# Patient Record
Sex: FEMALE | Race: WHITE | NOT HISPANIC OR LATINO | Employment: FULL TIME | URBAN - METROPOLITAN AREA
[De-identification: names, ages, dates, MRNs, and addresses within clinical notes are randomized per-mention and may not be internally consistent; named-entity substitution may affect disease eponyms.]

---
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
## 2022-01-04 ENCOUNTER — TELEPHONE (OUTPATIENT)
Dept: OBSTETRICS AND GYNECOLOGY | Facility: CLINIC | Age: 22
End: 2022-01-04

## 2022-01-04 NOTE — TELEPHONE ENCOUNTER
Oliver Vega OB pt requesting nicotine patches because she vapes? I am not sure if this should go directly to Dr. Boyd, but thought I would check with you first.    Pharmacy: [facility]/pharmacy #[id number] - [address] - [phone] [facility] [phone] FX  Thank you,  Breonna

## 2022-01-04 NOTE — TELEPHONE ENCOUNTER
Phone call. 17w3d. OB 01/26/2022. Requesting nicotine patches because she vapes. See previous message. Thank you.

## 2022-01-21 ENCOUNTER — TELEPHONE (OUTPATIENT)
Dept: OBSTETRICS AND GYNECOLOGY | Facility: CLINIC | Age: 22
End: 2022-01-21

## 2022-01-21 RX ORDER — NITROFURANTOIN 25; 75 MG/1; MG/1
100 CAPSULE ORAL 2 TIMES DAILY
Qty: 14 CAPSULE | Refills: 0 | Status: SHIPPED | OUTPATIENT
Start: 2022-01-21 | End: 2022-01-28

## 2022-01-27 ENCOUNTER — ROUTINE PRENATAL (OUTPATIENT)
Dept: OBSTETRICS AND GYNECOLOGY | Facility: CLINIC | Age: 22
End: 2022-01-27

## 2022-01-27 VITALS — SYSTOLIC BLOOD PRESSURE: 111 MMHG | BODY MASS INDEX: 24.62 KG/M2 | DIASTOLIC BLOOD PRESSURE: 59 MMHG | WEIGHT: 139 LBS

## 2022-01-27 DIAGNOSIS — Z34.00 SUPERVISION OF NORMAL FIRST PREGNANCY, ANTEPARTUM: ICD-10-CM

## 2022-01-27 DIAGNOSIS — O99.019 MATERNAL ANEMIA IN PREGNANCY, ANTEPARTUM: ICD-10-CM

## 2022-01-27 DIAGNOSIS — Z3A.20 20 WEEKS GESTATION OF PREGNANCY: Primary | ICD-10-CM

## 2022-01-27 LAB
GLUCOSE UR STRIP-MCNC: NEGATIVE MG/DL
PROT UR STRIP-MCNC: ABNORMAL MG/DL

## 2022-01-27 PROCEDURE — 99212 OFFICE O/P EST SF 10 MIN: CPT | Performed by: STUDENT IN AN ORGANIZED HEALTH CARE EDUCATION/TRAINING PROGRAM

## 2022-02-24 ENCOUNTER — ROUTINE PRENATAL (OUTPATIENT)
Dept: OBSTETRICS AND GYNECOLOGY | Facility: CLINIC | Age: 22
End: 2022-02-24

## 2022-02-24 VITALS — DIASTOLIC BLOOD PRESSURE: 73 MMHG | WEIGHT: 147 LBS | SYSTOLIC BLOOD PRESSURE: 119 MMHG | BODY MASS INDEX: 26.04 KG/M2

## 2022-02-24 DIAGNOSIS — Z87.440 HISTORY OF GBS (GROUP B STREPTOCOCCUS) UTI, CURRENTLY PREGNANT: ICD-10-CM

## 2022-02-24 DIAGNOSIS — Z3A.24 24 WEEKS GESTATION OF PREGNANCY: Primary | ICD-10-CM

## 2022-02-24 DIAGNOSIS — O99.330 TOBACCO USE DURING PREGNANCY, ANTEPARTUM: ICD-10-CM

## 2022-02-24 DIAGNOSIS — Z34.02 ENCOUNTER FOR SUPERVISION OF NORMAL FIRST PREGNANCY IN SECOND TRIMESTER: ICD-10-CM

## 2022-02-24 DIAGNOSIS — Z34.90 PRENATAL CARE, ANTEPARTUM: ICD-10-CM

## 2022-02-24 DIAGNOSIS — O99.019 MATERNAL ANEMIA IN PREGNANCY, ANTEPARTUM: ICD-10-CM

## 2022-02-24 DIAGNOSIS — O09.899 HISTORY OF GBS (GROUP B STREPTOCOCCUS) UTI, CURRENTLY PREGNANT: ICD-10-CM

## 2022-02-24 DIAGNOSIS — Z30.09 ENCOUNTER FOR COUNSELING REGARDING CONTRACEPTION: ICD-10-CM

## 2022-02-24 LAB
GLUCOSE UR STRIP-MCNC: NEGATIVE MG/DL
PROT UR STRIP-MCNC: ABNORMAL MG/DL

## 2022-02-24 PROCEDURE — 99214 OFFICE O/P EST MOD 30 MIN: CPT | Performed by: STUDENT IN AN ORGANIZED HEALTH CARE EDUCATION/TRAINING PROGRAM

## 2022-02-26 LAB
BACTERIA UR CULT: NORMAL
BACTERIA UR CULT: NORMAL

## 2022-03-24 ENCOUNTER — ROUTINE PRENATAL (OUTPATIENT)
Dept: OBSTETRICS AND GYNECOLOGY | Facility: CLINIC | Age: 22
End: 2022-03-24

## 2022-03-24 VITALS — BODY MASS INDEX: 26.57 KG/M2 | WEIGHT: 150 LBS | DIASTOLIC BLOOD PRESSURE: 70 MMHG | SYSTOLIC BLOOD PRESSURE: 112 MMHG

## 2022-03-24 DIAGNOSIS — Z34.93 PRENATAL CARE IN THIRD TRIMESTER: ICD-10-CM

## 2022-03-24 DIAGNOSIS — O99.330 TOBACCO USE DURING PREGNANCY, ANTEPARTUM: ICD-10-CM

## 2022-03-24 DIAGNOSIS — Z3A.28 28 WEEKS GESTATION OF PREGNANCY: Primary | ICD-10-CM

## 2022-03-24 DIAGNOSIS — Z34.00 SUPERVISION OF NORMAL FIRST PREGNANCY, ANTEPARTUM: ICD-10-CM

## 2022-03-24 DIAGNOSIS — O99.019 MATERNAL ANEMIA IN PREGNANCY, ANTEPARTUM: ICD-10-CM

## 2022-03-24 LAB
GLUCOSE UR STRIP-MCNC: NEGATIVE MG/DL
PROT UR STRIP-MCNC: ABNORMAL MG/DL

## 2022-03-24 PROCEDURE — 99212 OFFICE O/P EST SF 10 MIN: CPT | Performed by: STUDENT IN AN ORGANIZED HEALTH CARE EDUCATION/TRAINING PROGRAM

## 2022-03-25 DIAGNOSIS — O99.019 MATERNAL ANEMIA IN PREGNANCY, ANTEPARTUM: Primary | ICD-10-CM

## 2022-03-25 RX ORDER — FERROUS SULFATE 325(65) MG
325 TABLET ORAL
Qty: 30 TABLET | Refills: 5 | Status: SHIPPED | OUTPATIENT
Start: 2022-03-25 | End: 2022-06-08 | Stop reason: HOSPADM

## 2022-03-26 ENCOUNTER — REFERRAL TRIAGE (OUTPATIENT)
Dept: LABOR AND DELIVERY | Facility: HOSPITAL | Age: 22
End: 2022-03-26

## 2022-04-07 ENCOUNTER — ROUTINE PRENATAL (OUTPATIENT)
Dept: OBSTETRICS AND GYNECOLOGY | Facility: CLINIC | Age: 22
End: 2022-04-07

## 2022-04-07 VITALS — BODY MASS INDEX: 27.1 KG/M2 | WEIGHT: 153 LBS | DIASTOLIC BLOOD PRESSURE: 63 MMHG | SYSTOLIC BLOOD PRESSURE: 113 MMHG

## 2022-04-07 DIAGNOSIS — Z87.440 HISTORY OF GBS (GROUP B STREPTOCOCCUS) UTI, CURRENTLY PREGNANT: ICD-10-CM

## 2022-04-07 DIAGNOSIS — Z34.00 SUPERVISION OF NORMAL FIRST PREGNANCY, ANTEPARTUM: ICD-10-CM

## 2022-04-07 DIAGNOSIS — O09.899 HISTORY OF GBS (GROUP B STREPTOCOCCUS) UTI, CURRENTLY PREGNANT: ICD-10-CM

## 2022-04-07 DIAGNOSIS — Z3A.30 30 WEEKS GESTATION OF PREGNANCY: Primary | ICD-10-CM

## 2022-04-07 DIAGNOSIS — B37.9 YEAST INFECTION: ICD-10-CM

## 2022-04-07 DIAGNOSIS — O99.019 MATERNAL ANEMIA IN PREGNANCY, ANTEPARTUM: ICD-10-CM

## 2022-04-07 DIAGNOSIS — Z72.0 VAPES NICOTINE CONTAINING SUBSTANCE: ICD-10-CM

## 2022-04-07 LAB
GLUCOSE UR STRIP-MCNC: NEGATIVE MG/DL
PROT UR STRIP-MCNC: ABNORMAL MG/DL

## 2022-04-07 PROCEDURE — 99213 OFFICE O/P EST LOW 20 MIN: CPT | Performed by: STUDENT IN AN ORGANIZED HEALTH CARE EDUCATION/TRAINING PROGRAM

## 2022-04-07 RX ORDER — FLUCONAZOLE 150 MG/1
150 TABLET ORAL ONCE
Qty: 1 TABLET | Refills: 3 | Status: SHIPPED | OUTPATIENT
Start: 2022-04-07 | End: 2022-04-07

## 2022-04-07 NOTE — PROGRESS NOTES
Chief Complaint   Patient presents with   • Routine Prenatal Visit      Anita Lazcano is a 22 y.o.  at 30w5d who presents for routine prenatal visit. She reports doing well. She is still vaping and does it about 5 times a day. Denies vaginal bleeding, cramping, contractions, LOF. She reports active fetal movement. She reports that she has another yeast infection and is requesting medication today.     /63   Wt 69.4 kg (153 lb)   LMP 2021   BMI 27.10 kg/m²    Gen: well appearing, NAD   Abd: gravid, nontender  See OB Flowsheet    ASSESSMENT:   1. IUP at 30w5d   2. Supervision of normal pregnancy  3. Maternal anemia- on Fe supplementation  4. History of GBS UTI in first trimester  5. Tobacco use in pregnancy- vapes   6. Yeast infection     PLAN:  Problem list reviewed and updated.   Reviewed expectations of this stage of pregnancy.   Third trimester precautions reviewed including  labor signs, monitoring fetal movements.   Encouraged cessation of vape use and patient is working on decreasing use.   Prescribed diflucan 150 mg Q 3 days x 2 tabs for treatment of yeast infection.   Return in about 2 weeks (around 2022) for prenatal visit .    Patient Active Problem List    Diagnosis Date Noted   • Maternal anemia in pregnancy, antepartum 2022   • History of GBS (group B streptococcus) UTI, currently pregnant 2022   • Pregnancy 10/21/2021       Orders Placed This Encounter   Procedures   • POC Urinalysis Dipstick     This is an external result entered through the Results Console.     Order Specific Question:   Release to patient     Answer:   Immediate     Tiffanie Boyd MD

## 2022-05-09 ENCOUNTER — ROUTINE PRENATAL (OUTPATIENT)
Dept: OBSTETRICS AND GYNECOLOGY | Facility: CLINIC | Age: 22
End: 2022-05-09

## 2022-05-09 VITALS — BODY MASS INDEX: 27.99 KG/M2 | WEIGHT: 158 LBS | SYSTOLIC BLOOD PRESSURE: 115 MMHG | DIASTOLIC BLOOD PRESSURE: 63 MMHG

## 2022-05-09 DIAGNOSIS — Z3A.35 35 WEEKS GESTATION OF PREGNANCY: Primary | ICD-10-CM

## 2022-05-09 DIAGNOSIS — O99.019 MATERNAL ANEMIA IN PREGNANCY, ANTEPARTUM: ICD-10-CM

## 2022-05-09 DIAGNOSIS — O09.33 INSUFFICIENT PRENATAL CARE IN THIRD TRIMESTER: ICD-10-CM

## 2022-05-09 DIAGNOSIS — Z34.00 SUPERVISION OF NORMAL FIRST PREGNANCY, ANTEPARTUM: ICD-10-CM

## 2022-05-09 DIAGNOSIS — O99.330 TOBACCO USE DURING PREGNANCY, ANTEPARTUM: ICD-10-CM

## 2022-05-09 LAB
GLUCOSE UR STRIP-MCNC: NEGATIVE MG/DL
PROT UR STRIP-MCNC: ABNORMAL MG/DL

## 2022-05-09 PROCEDURE — 99213 OFFICE O/P EST LOW 20 MIN: CPT | Performed by: STUDENT IN AN ORGANIZED HEALTH CARE EDUCATION/TRAINING PROGRAM

## 2022-05-09 NOTE — PROGRESS NOTES
Chief Complaint   Patient presents with   • Routine Prenatal Visit      Anita Lazcano is a 22 y.o.  at 35w2d who presents for routine prenatal visit. She reports doing well and has been unable to make the last 3 appointments. She denies vaginal bleeding, cramping, contractions, LOF. She reports active fetal movement. She is no longer using Nicoderm patches and is smoking vape throughout the day.     /63   Wt 71.7 kg (158 lb)   LMP 2021   BMI 27.99 kg/m²    Gen: well appearing, NAD   Abd: gravid, nontender  See OB Flowsheet    ASSESSMENT:   1.IUP at 35w2d   2. Supervision of normal pregnancy  3. Maternal anemia- on Fe supplementation  4. History of GBS UTI in first trimester  5. Tobacco use in pregnancy- vapes  6. Insufficient prenatal care    PLAN:  Problem list reviewed and updated.   Reviewed expectations of this stage of pregnancy.   Third trimester precautions reviewed including labor signs, monitoring fetal movements.   Will obtain growth ultrasound at next appointment given insufficient prenatal care, maternal anemia and tobacco use in pregnancy.  Encouraged cessation of vape products.   Return in about 10 days (around 2022) for Prenatal visit and growth ultrasound .    Patient Active Problem List    Diagnosis Date Noted   • Maternal anemia in pregnancy, antepartum 2022   • History of GBS (group B streptococcus) UTI, currently pregnant 2022   • Pregnancy 10/21/2021       Orders Placed This Encounter   Procedures   • US ob follow up transabdominal approach     Standing Status:   Future     Standing Expiration Date:   2023     Order Specific Question:   Reason for Exam:     Answer:   growth ultrasound- insufficient prenatal care     Order Specific Question:   Release to patient     Answer:   Immediate   • POC Urinalysis Dipstick     This is an external result entered through the Results Console.     Order Specific Question:   Release to patient     Answer:   Immediate      Tiffanie Boyd MD

## 2022-05-19 ENCOUNTER — ROUTINE PRENATAL (OUTPATIENT)
Dept: OBSTETRICS AND GYNECOLOGY | Facility: CLINIC | Age: 22
End: 2022-05-19

## 2022-05-19 VITALS — BODY MASS INDEX: 27.1 KG/M2 | WEIGHT: 153 LBS | SYSTOLIC BLOOD PRESSURE: 105 MMHG | DIASTOLIC BLOOD PRESSURE: 66 MMHG

## 2022-05-19 DIAGNOSIS — Z87.440 HISTORY OF GBS (GROUP B STREPTOCOCCUS) UTI, CURRENTLY PREGNANT: ICD-10-CM

## 2022-05-19 DIAGNOSIS — O09.899 HISTORY OF GBS (GROUP B STREPTOCOCCUS) UTI, CURRENTLY PREGNANT: ICD-10-CM

## 2022-05-19 DIAGNOSIS — O09.30 INSUFFICIENT ANTEPARTUM CARE: ICD-10-CM

## 2022-05-19 DIAGNOSIS — Z3A.36 36 WEEKS GESTATION OF PREGNANCY: Primary | ICD-10-CM

## 2022-05-19 DIAGNOSIS — Z34.93 PRENATAL CARE IN THIRD TRIMESTER: ICD-10-CM

## 2022-05-19 DIAGNOSIS — O99.019 MATERNAL ANEMIA IN PREGNANCY, ANTEPARTUM: ICD-10-CM

## 2022-05-19 LAB
GLUCOSE UR STRIP-MCNC: NEGATIVE MG/DL
PROT UR STRIP-MCNC: ABNORMAL MG/DL

## 2022-05-19 PROCEDURE — 99213 OFFICE O/P EST LOW 20 MIN: CPT | Performed by: STUDENT IN AN ORGANIZED HEALTH CARE EDUCATION/TRAINING PROGRAM

## 2022-05-26 ENCOUNTER — ROUTINE PRENATAL (OUTPATIENT)
Dept: OBSTETRICS AND GYNECOLOGY | Facility: CLINIC | Age: 22
End: 2022-05-26

## 2022-05-26 VITALS — BODY MASS INDEX: 27.63 KG/M2 | DIASTOLIC BLOOD PRESSURE: 70 MMHG | SYSTOLIC BLOOD PRESSURE: 115 MMHG | WEIGHT: 156 LBS

## 2022-05-26 DIAGNOSIS — Z72.0 VAPES NICOTINE CONTAINING SUBSTANCE: ICD-10-CM

## 2022-05-26 DIAGNOSIS — Z34.93 PRENATAL CARE IN THIRD TRIMESTER: ICD-10-CM

## 2022-05-26 DIAGNOSIS — Z3A.37 37 WEEKS GESTATION OF PREGNANCY: Primary | ICD-10-CM

## 2022-05-26 DIAGNOSIS — Z87.440 HISTORY OF GBS (GROUP B STREPTOCOCCUS) UTI, CURRENTLY PREGNANT: ICD-10-CM

## 2022-05-26 DIAGNOSIS — O09.899 HISTORY OF GBS (GROUP B STREPTOCOCCUS) UTI, CURRENTLY PREGNANT: ICD-10-CM

## 2022-05-26 DIAGNOSIS — O99.019 MATERNAL ANEMIA IN PREGNANCY, ANTEPARTUM: ICD-10-CM

## 2022-05-26 LAB
GLUCOSE UR STRIP-MCNC: NEGATIVE MG/DL
PROT UR STRIP-MCNC: ABNORMAL MG/DL

## 2022-05-26 PROCEDURE — 99212 OFFICE O/P EST SF 10 MIN: CPT | Performed by: STUDENT IN AN ORGANIZED HEALTH CARE EDUCATION/TRAINING PROGRAM

## 2022-06-02 ENCOUNTER — ROUTINE PRENATAL (OUTPATIENT)
Dept: OBSTETRICS AND GYNECOLOGY | Facility: CLINIC | Age: 22
End: 2022-06-02

## 2022-06-02 VITALS — DIASTOLIC BLOOD PRESSURE: 75 MMHG | SYSTOLIC BLOOD PRESSURE: 116 MMHG | BODY MASS INDEX: 28.17 KG/M2 | WEIGHT: 159 LBS

## 2022-06-02 DIAGNOSIS — Z3A.38 38 WEEKS GESTATION OF PREGNANCY: Primary | ICD-10-CM

## 2022-06-02 DIAGNOSIS — Z72.0 VAPES NICOTINE CONTAINING SUBSTANCE: ICD-10-CM

## 2022-06-02 DIAGNOSIS — Z87.440 HISTORY OF GBS (GROUP B STREPTOCOCCUS) UTI, CURRENTLY PREGNANT: ICD-10-CM

## 2022-06-02 DIAGNOSIS — O09.899 HISTORY OF GBS (GROUP B STREPTOCOCCUS) UTI, CURRENTLY PREGNANT: ICD-10-CM

## 2022-06-02 DIAGNOSIS — O99.019 MATERNAL ANEMIA IN PREGNANCY, ANTEPARTUM: ICD-10-CM

## 2022-06-02 DIAGNOSIS — Z34.93 PRENATAL CARE IN THIRD TRIMESTER: ICD-10-CM

## 2022-06-02 LAB
GLUCOSE UR STRIP-MCNC: NEGATIVE MG/DL
PROT UR STRIP-MCNC: NEGATIVE MG/DL

## 2022-06-02 PROCEDURE — 99212 OFFICE O/P EST SF 10 MIN: CPT | Performed by: STUDENT IN AN ORGANIZED HEALTH CARE EDUCATION/TRAINING PROGRAM

## 2022-06-02 NOTE — PROGRESS NOTES
Chief Complaint   Patient presents with   • Routine Prenatal Visit      Anita Lazcano is a 22 y.o.  at 38w5d who presents for routine prenatal visit. She report doing well and has no complaints today. Denies vaginal bleeding, cramping, contractions, LOF. She reports active fetal movement.     /75   Wt 72.1 kg (159 lb)   LMP 2021   BMI 28.17 kg/m²    Gen: well appearing, NAD   Abd: gravid, nontender  SVE: /-3   See OB Flowsheet    ASSESSMENT:   1. IUP at 38w5d   2. Prenatal care in third trimester  3. Maternal anemia- on Fe supplementation  4. History of GBS UTI in first trimester- will need antibiotic prophylaxis in labor  5. Tobacco use in pregnancy- vapes     PLAN:  Problem list reviewed and updated.   Reviewed expectations of this stage of pregnancy.   Third trimester precautions reviewed including labor signs, monitoring fetal movements. Provided a map of hospital location today.   We discussed consideration of elective induction of labor at 39 weeks and the patient would like to await labor.   Return in about 1 week (around 2022) for prenatal visit.    Patient Active Problem List    Diagnosis Date Noted   • Maternal anemia in pregnancy, antepartum 2022   • History of GBS (group B streptococcus) UTI, currently pregnant 2022   • Pregnancy 10/21/2021       Orders Placed This Encounter   Procedures   • POC Urinalysis Dipstick     This is an external result entered through the Results Console.     Order Specific Question:   Release to patient     Answer:   Immediate       Tiffanie Boyd MD

## 2022-06-06 ENCOUNTER — HOSPITAL ENCOUNTER (INPATIENT)
Facility: HOSPITAL | Age: 22
LOS: 2 days | Discharge: HOME | End: 2022-06-08
Attending: STUDENT IN AN ORGANIZED HEALTH CARE EDUCATION/TRAINING PROGRAM | Admitting: STUDENT IN AN ORGANIZED HEALTH CARE EDUCATION/TRAINING PROGRAM

## 2022-06-06 ENCOUNTER — ANESTHESIA (OUTPATIENT)
Dept: LABOR AND DELIVERY | Facility: HOSPITAL | Age: 22
End: 2022-06-06

## 2022-06-06 ENCOUNTER — ANESTHESIA EVENT (OUTPATIENT)
Dept: LABOR AND DELIVERY | Facility: HOSPITAL | Age: 22
End: 2022-06-06

## 2022-06-06 ENCOUNTER — TELEPHONE (OUTPATIENT)
Dept: OBSTETRICS AND GYNECOLOGY | Facility: CLINIC | Age: 22
End: 2022-06-06

## 2022-06-06 LAB
ABO GROUP BLD: NORMAL
BLD GP AB SCN SERPL QL: NEGATIVE
DEPRECATED RDW RBC AUTO: 39.4 FL (ref 37–54)
ERYTHROCYTE [DISTWIDTH] IN BLOOD BY AUTOMATED COUNT: 13.2 % (ref 12.3–15.4)
HCT VFR BLD AUTO: 34.3 % (ref 34–46.6)
HGB BLD-MCNC: 11.8 G/DL (ref 12–15.9)
MCH RBC QN AUTO: 28.6 PG (ref 26.6–33)
MCHC RBC AUTO-ENTMCNC: 34.4 G/DL (ref 31.5–35.7)
MCV RBC AUTO: 83.1 FL (ref 79–97)
PLATELET # BLD AUTO: 241 10*3/MM3 (ref 140–450)
PMV BLD AUTO: 10 FL (ref 6–12)
RBC # BLD AUTO: 4.13 10*6/MM3 (ref 3.77–5.28)
RH BLD: POSITIVE
SARS-COV-2 RNA PNL SPEC NAA+PROBE: NOT DETECTED
T&S EXPIRATION DATE: NORMAL
WBC NRBC COR # BLD: 13.54 10*3/MM3 (ref 3.4–10.8)

## 2022-06-06 PROCEDURE — 86901 BLOOD TYPING SEROLOGIC RH(D): CPT | Performed by: STUDENT IN AN ORGANIZED HEALTH CARE EDUCATION/TRAINING PROGRAM

## 2022-06-06 PROCEDURE — 87635 SARS-COV-2 COVID-19 AMP PRB: CPT | Performed by: STUDENT IN AN ORGANIZED HEALTH CARE EDUCATION/TRAINING PROGRAM

## 2022-06-06 PROCEDURE — 10907ZC DRAINAGE OF AMNIOTIC FLUID, THERAPEUTIC FROM PRODUCTS OF CONCEPTION, VIA NATURAL OR ARTIFICIAL OPENING: ICD-10-PCS | Performed by: STUDENT IN AN ORGANIZED HEALTH CARE EDUCATION/TRAINING PROGRAM

## 2022-06-06 PROCEDURE — 86900 BLOOD TYPING SEROLOGIC ABO: CPT | Performed by: STUDENT IN AN ORGANIZED HEALTH CARE EDUCATION/TRAINING PROGRAM

## 2022-06-06 PROCEDURE — 25010000002 PENICILLIN G POTASSIUM PER 600000 UNITS: Performed by: STUDENT IN AN ORGANIZED HEALTH CARE EDUCATION/TRAINING PROGRAM

## 2022-06-06 PROCEDURE — C1755 CATHETER, INTRASPINAL: HCPCS

## 2022-06-06 PROCEDURE — 85027 COMPLETE CBC AUTOMATED: CPT | Performed by: STUDENT IN AN ORGANIZED HEALTH CARE EDUCATION/TRAINING PROGRAM

## 2022-06-06 PROCEDURE — 86850 RBC ANTIBODY SCREEN: CPT | Performed by: STUDENT IN AN ORGANIZED HEALTH CARE EDUCATION/TRAINING PROGRAM

## 2022-06-06 PROCEDURE — 0KQM0ZZ REPAIR PERINEUM MUSCLE, OPEN APPROACH: ICD-10-PCS | Performed by: STUDENT IN AN ORGANIZED HEALTH CARE EDUCATION/TRAINING PROGRAM

## 2022-06-06 PROCEDURE — 25010000002 ROPIVACAINE PER 1 MG: Performed by: STUDENT IN AN ORGANIZED HEALTH CARE EDUCATION/TRAINING PROGRAM

## 2022-06-06 PROCEDURE — 59410 OBSTETRICAL CARE: CPT | Performed by: STUDENT IN AN ORGANIZED HEALTH CARE EDUCATION/TRAINING PROGRAM

## 2022-06-06 PROCEDURE — C1755 CATHETER, INTRASPINAL: HCPCS | Performed by: STUDENT IN AN ORGANIZED HEALTH CARE EDUCATION/TRAINING PROGRAM

## 2022-06-06 RX ORDER — ONDANSETRON 4 MG/1
4 TABLET, FILM COATED ORAL EVERY 8 HOURS PRN
Status: DISCONTINUED | OUTPATIENT
Start: 2022-06-06 | End: 2022-06-08 | Stop reason: HOSPADM

## 2022-06-06 RX ORDER — CARBOPROST TROMETHAMINE 250 UG/ML
250 INJECTION, SOLUTION INTRAMUSCULAR
Status: DISCONTINUED | OUTPATIENT
Start: 2022-06-06 | End: 2022-06-06 | Stop reason: HOSPADM

## 2022-06-06 RX ORDER — MAGNESIUM CARB/ALUMINUM HYDROX 105-160MG
30 TABLET,CHEWABLE ORAL ONCE
Status: DISCONTINUED | OUTPATIENT
Start: 2022-06-06 | End: 2022-06-06 | Stop reason: HOSPADM

## 2022-06-06 RX ORDER — DIPHENHYDRAMINE HCL 25 MG
25 CAPSULE ORAL NIGHTLY PRN
Status: DISCONTINUED | OUTPATIENT
Start: 2022-06-06 | End: 2022-06-08 | Stop reason: HOSPADM

## 2022-06-06 RX ORDER — METHYLERGONOVINE MALEATE 0.2 MG/ML
200 INJECTION INTRAVENOUS ONCE AS NEEDED
Status: DISCONTINUED | OUTPATIENT
Start: 2022-06-06 | End: 2022-06-06 | Stop reason: HOSPADM

## 2022-06-06 RX ORDER — HYDROCORTISONE 25 MG/G
1 CREAM TOPICAL AS NEEDED
Status: DISCONTINUED | OUTPATIENT
Start: 2022-06-06 | End: 2022-06-08 | Stop reason: HOSPADM

## 2022-06-06 RX ORDER — FENTANYL CIT 0.2 MG/100ML-ROPIV 0.2%-NACL 0.9% EPIDURAL INJ 2/0.2 MCG/ML-%
10 SOLUTION INJECTION CONTINUOUS
Status: DISCONTINUED | OUTPATIENT
Start: 2022-06-06 | End: 2022-06-06

## 2022-06-06 RX ORDER — ERYTHROMYCIN 5 MG/G
OINTMENT OPHTHALMIC
Status: ACTIVE
Start: 2022-06-06 | End: 2022-06-07

## 2022-06-06 RX ORDER — SODIUM CHLORIDE, SODIUM LACTATE, POTASSIUM CHLORIDE, CALCIUM CHLORIDE 600; 310; 30; 20 MG/100ML; MG/100ML; MG/100ML; MG/100ML
125 INJECTION, SOLUTION INTRAVENOUS CONTINUOUS
Status: DISCONTINUED | OUTPATIENT
Start: 2022-06-06 | End: 2022-06-06

## 2022-06-06 RX ORDER — PENICILLIN G 3000000 [IU]/50ML
3 INJECTION, SOLUTION INTRAVENOUS EVERY 4 HOURS
Status: DISCONTINUED | OUTPATIENT
Start: 2022-06-06 | End: 2022-06-06 | Stop reason: HOSPADM

## 2022-06-06 RX ORDER — OXYCODONE HYDROCHLORIDE AND ACETAMINOPHEN 5; 325 MG/1; MG/1
1 TABLET ORAL EVERY 4 HOURS PRN
Status: DISCONTINUED | OUTPATIENT
Start: 2022-06-06 | End: 2022-06-08 | Stop reason: HOSPADM

## 2022-06-06 RX ORDER — PHYTONADIONE 1 MG/.5ML
INJECTION, EMULSION INTRAMUSCULAR; INTRAVENOUS; SUBCUTANEOUS
Status: ACTIVE
Start: 2022-06-06 | End: 2022-06-07

## 2022-06-06 RX ORDER — LIDOCAINE HYDROCHLORIDE 10 MG/ML
5 INJECTION, SOLUTION EPIDURAL; INFILTRATION; INTRACAUDAL; PERINEURAL AS NEEDED
Status: DISCONTINUED | OUTPATIENT
Start: 2022-06-06 | End: 2022-06-06 | Stop reason: HOSPADM

## 2022-06-06 RX ORDER — SODIUM CHLORIDE 0.9 % (FLUSH) 0.9 %
1-10 SYRINGE (ML) INJECTION AS NEEDED
Status: DISCONTINUED | OUTPATIENT
Start: 2022-06-06 | End: 2022-06-08 | Stop reason: HOSPADM

## 2022-06-06 RX ORDER — SODIUM CHLORIDE 0.9 % (FLUSH) 0.9 %
10 SYRINGE (ML) INJECTION AS NEEDED
Status: DISCONTINUED | OUTPATIENT
Start: 2022-06-06 | End: 2022-06-06 | Stop reason: HOSPADM

## 2022-06-06 RX ORDER — FERROUS SULFATE 325(65) MG
325 TABLET ORAL 2 TIMES DAILY WITH MEALS
Status: DISCONTINUED | OUTPATIENT
Start: 2022-06-07 | End: 2022-06-08 | Stop reason: HOSPADM

## 2022-06-06 RX ORDER — FAMOTIDINE 10 MG/ML
20 INJECTION, SOLUTION INTRAVENOUS ONCE AS NEEDED
Status: DISCONTINUED | OUTPATIENT
Start: 2022-06-06 | End: 2022-06-06 | Stop reason: HOSPADM

## 2022-06-06 RX ORDER — ROPIVACAINE HYDROCHLORIDE 2 MG/ML
INJECTION, SOLUTION EPIDURAL; INFILTRATION; PERINEURAL AS NEEDED
Status: DISCONTINUED | OUTPATIENT
Start: 2022-06-06 | End: 2022-06-06 | Stop reason: SURG

## 2022-06-06 RX ORDER — IBUPROFEN 600 MG/1
600 TABLET ORAL EVERY 8 HOURS PRN
Status: DISCONTINUED | OUTPATIENT
Start: 2022-06-06 | End: 2022-06-08 | Stop reason: HOSPADM

## 2022-06-06 RX ORDER — OXYTOCIN/0.9 % SODIUM CHLORIDE 30/500 ML
999 PLASTIC BAG, INJECTION (ML) INTRAVENOUS ONCE
Status: COMPLETED | OUTPATIENT
Start: 2022-06-06 | End: 2022-06-06

## 2022-06-06 RX ORDER — MISOPROSTOL 200 UG/1
800 TABLET ORAL ONCE AS NEEDED
Status: DISCONTINUED | OUTPATIENT
Start: 2022-06-06 | End: 2022-06-06 | Stop reason: HOSPADM

## 2022-06-06 RX ORDER — ONDANSETRON 2 MG/ML
4 INJECTION INTRAMUSCULAR; INTRAVENOUS ONCE AS NEEDED
Status: DISCONTINUED | OUTPATIENT
Start: 2022-06-06 | End: 2022-06-06 | Stop reason: HOSPADM

## 2022-06-06 RX ORDER — EPHEDRINE SULFATE 50 MG/ML
5 INJECTION, SOLUTION INTRAVENOUS AS NEEDED
Status: DISCONTINUED | OUTPATIENT
Start: 2022-06-06 | End: 2022-06-06 | Stop reason: HOSPADM

## 2022-06-06 RX ORDER — OXYTOCIN/0.9 % SODIUM CHLORIDE 30/500 ML
125 PLASTIC BAG, INJECTION (ML) INTRAVENOUS CONTINUOUS PRN
Status: DISCONTINUED | OUTPATIENT
Start: 2022-06-06 | End: 2022-06-06

## 2022-06-06 RX ORDER — LIDOCAINE HYDROCHLORIDE AND EPINEPHRINE 15; 5 MG/ML; UG/ML
INJECTION, SOLUTION EPIDURAL AS NEEDED
Status: DISCONTINUED | OUTPATIENT
Start: 2022-06-06 | End: 2022-06-06 | Stop reason: SURG

## 2022-06-06 RX ORDER — DIPHENHYDRAMINE HYDROCHLORIDE 50 MG/ML
12.5 INJECTION INTRAMUSCULAR; INTRAVENOUS EVERY 8 HOURS PRN
Status: DISCONTINUED | OUTPATIENT
Start: 2022-06-06 | End: 2022-06-06 | Stop reason: HOSPADM

## 2022-06-06 RX ORDER — OXYTOCIN/0.9 % SODIUM CHLORIDE 30/500 ML
250 PLASTIC BAG, INJECTION (ML) INTRAVENOUS CONTINUOUS PRN
Status: ACTIVE | OUTPATIENT
Start: 2022-06-06 | End: 2022-06-06

## 2022-06-06 RX ORDER — DOCUSATE SODIUM 100 MG/1
100 CAPSULE, LIQUID FILLED ORAL 2 TIMES DAILY
Status: DISCONTINUED | OUTPATIENT
Start: 2022-06-07 | End: 2022-06-08 | Stop reason: HOSPADM

## 2022-06-06 RX ORDER — ONDANSETRON 2 MG/ML
4 INJECTION INTRAMUSCULAR; INTRAVENOUS EVERY 6 HOURS PRN
Status: DISCONTINUED | OUTPATIENT
Start: 2022-06-06 | End: 2022-06-08 | Stop reason: HOSPADM

## 2022-06-06 RX ORDER — BISACODYL 10 MG
10 SUPPOSITORY, RECTAL RECTAL DAILY PRN
Status: DISCONTINUED | OUTPATIENT
Start: 2022-06-07 | End: 2022-06-08 | Stop reason: HOSPADM

## 2022-06-06 RX ORDER — SODIUM CHLORIDE 0.9 % (FLUSH) 0.9 %
10 SYRINGE (ML) INJECTION EVERY 12 HOURS SCHEDULED
Status: DISCONTINUED | OUTPATIENT
Start: 2022-06-06 | End: 2022-06-06 | Stop reason: HOSPADM

## 2022-06-06 RX ORDER — OXYTOCIN/0.9 % SODIUM CHLORIDE 30/500 ML
125 PLASTIC BAG, INJECTION (ML) INTRAVENOUS CONTINUOUS PRN
Status: DISCONTINUED | OUTPATIENT
Start: 2022-06-06 | End: 2022-06-08 | Stop reason: HOSPADM

## 2022-06-06 RX ADMIN — ROPIVACAINE HYDROCHLORIDE 5 ML: 2 INJECTION, SOLUTION EPIDURAL; INFILTRATION at 17:20

## 2022-06-06 RX ADMIN — IBUPROFEN 600 MG: 600 TABLET, FILM COATED ORAL at 23:47

## 2022-06-06 RX ADMIN — Medication 999 ML/HR: at 20:02

## 2022-06-06 RX ADMIN — SODIUM CHLORIDE, POTASSIUM CHLORIDE, SODIUM LACTATE AND CALCIUM CHLORIDE 125 ML/HR: 600; 310; 30; 20 INJECTION, SOLUTION INTRAVENOUS at 14:58

## 2022-06-06 RX ADMIN — PENICILLIN G 3 MILLION UNITS: 3000000 INJECTION, SOLUTION INTRAVENOUS at 18:43

## 2022-06-06 RX ADMIN — SODIUM CHLORIDE, POTASSIUM CHLORIDE, SODIUM LACTATE AND CALCIUM CHLORIDE 1000 ML: 600; 310; 30; 20 INJECTION, SOLUTION INTRAVENOUS at 17:39

## 2022-06-06 RX ADMIN — Medication 10 ML/HR: at 17:20

## 2022-06-06 RX ADMIN — LIDOCAINE HYDROCHLORIDE AND EPINEPHRINE 3 ML: 15; 5 INJECTION, SOLUTION EPIDURAL at 17:12

## 2022-06-06 RX ADMIN — PENICILLIN G POTASSIUM 5 MILLION UNITS: 5000000 INJECTION, POWDER, FOR SOLUTION INTRAMUSCULAR; INTRAVENOUS at 15:07

## 2022-06-06 RX ADMIN — ROPIVACAINE HYDROCHLORIDE 5 ML: 2 INJECTION, SOLUTION EPIDURAL; INFILTRATION at 17:16

## 2022-06-06 RX ADMIN — DOCUSATE SODIUM 100 MG: 100 CAPSULE, LIQUID FILLED ORAL at 23:47

## 2022-06-06 NOTE — ANESTHESIA PROCEDURE NOTES
Labor Epidural      Patient location during procedure: OB  Start Time: 6/6/2022 5:10 PM  Stop Time: 6/6/2022 5:12 PM  Performed By  Anesthesiologist: Mal Cintron MD  Preanesthetic Checklist  Completed: patient identified, IV checked, site marked, risks and benefits discussed, surgical consent, monitors and equipment checked, pre-op evaluation and timeout performed  Prep:  Pt Position:sitting  Sterile Tech:gloves, cap and sterile barrier  Prep:chlorhexidine gluconate and isopropyl alcohol  Monitoring:continuous pulse oximetry, EKG and blood pressure monitoring  Epidural Block Procedure:  Approach:midline  Guidance:landmark technique  Location:L4-L5  Needle Type:[employer]  Needle Gauge:17 G  Loss of Resistance Medium: air  Loss of Resistance: 6cm  Cath Depth at skin:11 cm  Paresthesia: none  Aspiration:negative  Test Dose:negative  Med administered at 6/6/2022 5:12 PM  Number of Attempts: 1  Post Assessment:  Dressing:secured with tape and occlusive dressing applied  Pt Tolerance:patient tolerated the procedure well with no apparent complications  Complications:no

## 2022-06-06 NOTE — INTERVAL H&P NOTE
H&P reviewed. The patient was examined and there are no changes to the H&P. Patient to be started on penicillin for GBS prophylaxis in setting of positive GBS status. Epidural PRN. Anticipate vaginal delivery. All questions and concerns have been answered with the patient.     Tiffanie Boyd MD   6/6/2022

## 2022-06-06 NOTE — TELEPHONE ENCOUNTER
Pt's mom called to report pt lost her mucus plug 2 days ago.  Started having contractions through the night that were waking her up from her sleep.  Contractions are between 5-8 minutes apart for most of the morning, and getting more intense.  Advised to go to [facility] for evaluation.

## 2022-06-06 NOTE — H&P (VIEW-ONLY)
[facility] Note OB        Patient Name: Anita Lazcano  YOB: 2000  MRN: [id number]  Admission Date: 2022  2:24 PM  Date of Service: 2022    Chief Complaint: No chief complaint on file.        Subjective     Anita Lazcano is a 22 y.o. female  at 39w2d with Estimated Date of Delivery: 22 who presents with the chief complaint listed above.  She sees Tiffanie Boyd MD for her prenatal care. Her pregnancy has been complicated by: GBS positive, terminal anemia.  Patient presents to OB ED secondary to contractions which began last night, and have become stronger over time.  She describes fetal movement as normal.  She denies rupture of membranes.  She denies vaginal bleeding. She is feeling contractions.          Objective   Patient Active Problem List    Diagnosis    • Maternal anemia in pregnancy, antepartum [O99.019]    • History of GBS (group B streptococcus) UTI, currently pregnant [O09.899, Z87.440]    • Pregnancy [Z34.90]         OB History    Para Term  AB Living   1 0 0 0 0 0   SAB IAB Ectopic Molar Multiple Live Births   0 0 0 0 0 0      # Outcome Date GA Lbr Gilberto/2nd Weight Sex Delivery Anes PTL Lv   1 Current                 No past medical history on file.    No past surgical history on file.    No current facility-administered medications on file prior to encounter.     Current Outpatient Medications on File Prior to Encounter   Medication Sig Dispense Refill   • ferrous sulfate 325 (65 FE) MG tablet Take 1 tablet by mouth Daily With Breakfast. 30 tablet 5   • nicotine (NICODERM CQ) 7 MG/24HR patch Place 1 patch on the skin as directed by provider Daily. 28 each 3   • Prenatal Vit-Fe Fumarate-FA (prenatal vitamin 28-0.8) 28-0.8 MG tablet tablet          No Known Allergies    No family history on file.    Social History     Socioeconomic History   • Marital status: Single   Tobacco Use   • Smoking status: Former Smoker   Vaping Use   • Vaping Use: Every day    Substance and Sexual Activity   • Alcohol use: Not Currently   • Drug use: Not Currently   • Sexual activity: Not Currently           Review of Systems   Constitutional: Negative for chills, fatigue and fever.   HENT: Negative.    Eyes: Negative for photophobia and visual disturbance.   Respiratory: Negative for cough, chest tightness and shortness of breath.    Cardiovascular: Negative for chest pain and leg swelling.   Gastrointestinal: Positive for abdominal pain ( intermittent abdominal pain ). Negative for diarrhea, nausea and vomiting.   Genitourinary: Negative for dysuria, flank pain, hematuria, pelvic pain, vaginal bleeding and vaginal discharge.   Musculoskeletal: Negative for back pain.   Neurological: Negative for dizziness, seizures, weakness and headaches.        PHYSICAL EXAM:      VITAL SIGNS:  There were no vitals filed for this visit.     FHT'S:                   Baseline:  125 -130 BPM  Variability:  Moderate = 6 - 25 BPM  Accelerations:  15 x 15 accelerations present     Decelerations:  absent  Contractions:   present     Interpretation:   Reactive NST, CAT 1 tracing        PHYSICAL EXAM:    General: well developed; well nourished  no acute distress   Heart: Not performed.   Lungs: breathing is unlabored     Abdomen: soft, non-tender; no masses  no umbilical or inguinal hernias are present       Cervix: was examined by nurse  Cervical Dilation (cm): 4-5  Cervical Effacement: 80-90%  Fetal Station: -2  Cervical Consistency: soft  Cervical Position: posterior   Contractions: regular        Extremities: peripheral pulses normal, no pedal edema, no clubbing or cyanosis      LABS AND TESTING ORDERED:  1. Uterine and fetal monitoring      LAB RESULTS:    No results found for this or any previous visit (from the past 24 hour(s)).    Lab Results   Component Value Date    ABO A 10/07/2021    RH Positive 10/07/2021       No results found for: STREPGPB              Assessment & Plan      ASSESSMENT/PLAN:  Anita Lazcano is a 22 y.o. female  at 39w2d who presented with abdominal pain and uterine contractions.   She was evaluated for labor and her cervix  was noted to be Cervical Dilation (cm): 4-5 cm/ Cervical Effacement: 80-90% % effaced/ vertex at Fetal Station: -2 station on initial exam. She is having regular contractions. She was noted to have a  Reactive NST.  CAT 1 tracing.  In view of these findings she will be admitted for delivery by Dr Oliver WATKINS , who will assume care and place orders at this time.      Final Impression:  • Pregnancy at 39w2d    • Reactive NST.  CAT 1 tracing  • In active labor with regular contractions and cervical cervical exam:Cervical Dilation (cm): 4-5 cm/ Cervical Effacement: 80-90% % effaced/ vertex at Fetal Station: -2 station  Known GBS positive urine  Lab Results   Component Value Date    ABO A 10/07/2021    RH Positive 10/07/2021     • COVID - 19 status pending    Plan:  • Patient will be admitted for delivery by Dr Oliver WATKINS  , who will assume care and place orders at this time.        I have spent 30 minutes including face to face time with the patient, greater than 50% in discussion of the diagnosis (counseling) and/or coordination of care.     Zeeshan Bowen MD  2022  14:49 EDT  OB Hospitalist  Phone:    [phone]

## 2022-06-06 NOTE — OBED NOTES
[facility] Note OB        Patient Name: Anita Lazcano  YOB: 2000  MRN: [id number]  Admission Date: 2022  2:24 PM  Date of Service: 2022    Chief Complaint: No chief complaint on file.        Subjective     Anita Lazcano is a 22 y.o. female  at 39w2d with Estimated Date of Delivery: 22 who presents with the chief complaint listed above.  She sees Tiffanie Boyd MD for her prenatal care. Her pregnancy has been complicated by: GBS positive, terminal anemia.  Patient presents to OB ED secondary to contractions which began last night, and have become stronger over time.  She describes fetal movement as normal.  She denies rupture of membranes.  She denies vaginal bleeding. She is feeling contractions.          Objective   Patient Active Problem List    Diagnosis    • Maternal anemia in pregnancy, antepartum [O99.019]    • History of GBS (group B streptococcus) UTI, currently pregnant [O09.899, Z87.440]    • Pregnancy [Z34.90]         OB History    Para Term  AB Living   1 0 0 0 0 0   SAB IAB Ectopic Molar Multiple Live Births   0 0 0 0 0 0      # Outcome Date GA Lbr Gilberto/2nd Weight Sex Delivery Anes PTL Lv   1 Current                 No past medical history on file.    No past surgical history on file.    No current facility-administered medications on file prior to encounter.     Current Outpatient Medications on File Prior to Encounter   Medication Sig Dispense Refill   • ferrous sulfate 325 (65 FE) MG tablet Take 1 tablet by mouth Daily With Breakfast. 30 tablet 5   • nicotine (NICODERM CQ) 7 MG/24HR patch Place 1 patch on the skin as directed by provider Daily. 28 each 3   • Prenatal Vit-Fe Fumarate-FA (prenatal vitamin 28-0.8) 28-0.8 MG tablet tablet          No Known Allergies    No family history on file.    Social History     Socioeconomic History   • Marital status: Single   Tobacco Use   • Smoking status: Former Smoker   Vaping Use   • Vaping Use: Every day    Substance and Sexual Activity   • Alcohol use: Not Currently   • Drug use: Not Currently   • Sexual activity: Not Currently           Review of Systems   Constitutional: Negative for chills, fatigue and fever.   HENT: Negative.    Eyes: Negative for photophobia and visual disturbance.   Respiratory: Negative for cough, chest tightness and shortness of breath.    Cardiovascular: Negative for chest pain and leg swelling.   Gastrointestinal: Positive for abdominal pain ( intermittent abdominal pain ). Negative for diarrhea, nausea and vomiting.   Genitourinary: Negative for dysuria, flank pain, hematuria, pelvic pain, vaginal bleeding and vaginal discharge.   Musculoskeletal: Negative for back pain.   Neurological: Negative for dizziness, seizures, weakness and headaches.        PHYSICAL EXAM:      VITAL SIGNS:  There were no vitals filed for this visit.     FHT'S:                   Baseline:  125 -130 BPM  Variability:  Moderate = 6 - 25 BPM  Accelerations:  15 x 15 accelerations present     Decelerations:  absent  Contractions:   present     Interpretation:   Reactive NST, CAT 1 tracing        PHYSICAL EXAM:    General: well developed; well nourished  no acute distress   Heart: Not performed.   Lungs: breathing is unlabored     Abdomen: soft, non-tender; no masses  no umbilical or inguinal hernias are present       Cervix: was examined by nurse  Cervical Dilation (cm): 4-5  Cervical Effacement: 80-90%  Fetal Station: -2  Cervical Consistency: soft  Cervical Position: posterior   Contractions: regular        Extremities: peripheral pulses normal, no pedal edema, no clubbing or cyanosis      LABS AND TESTING ORDERED:  1. Uterine and fetal monitoring      LAB RESULTS:    No results found for this or any previous visit (from the past 24 hour(s)).    Lab Results   Component Value Date    ABO A 10/07/2021    RH Positive 10/07/2021       No results found for: STREPGPB              Assessment & Plan      ASSESSMENT/PLAN:  Anita Lazcano is a 22 y.o. female  at 39w2d who presented with abdominal pain and uterine contractions.   She was evaluated for labor and her cervix  was noted to be Cervical Dilation (cm): 4-5 cm/ Cervical Effacement: 80-90% % effaced/ vertex at Fetal Station: -2 station on initial exam. She is having regular contractions. She was noted to have a  Reactive NST.  CAT 1 tracing.  In view of these findings she will be admitted for delivery by Dr Oliver WATKINS , who will assume care and place orders at this time.      Final Impression:  • Pregnancy at 39w2d    • Reactive NST.  CAT 1 tracing  • In active labor with regular contractions and cervical cervical exam:Cervical Dilation (cm): 4-5 cm/ Cervical Effacement: 80-90% % effaced/ vertex at Fetal Station: -2 station  Known GBS positive urine  Lab Results   Component Value Date    ABO A 10/07/2021    RH Positive 10/07/2021     • COVID - 19 status pending    Plan:  • Patient will be admitted for delivery by Dr Oliver WATKINS  , who will assume care and place orders at this time.        I have spent 30 minutes including face to face time with the patient, greater than 50% in discussion of the diagnosis (counseling) and/or coordination of care.     Zeeshan Bowen MD  2022  14:49 EDT  OB Hospitalist  Phone:    [phone]

## 2022-06-06 NOTE — ANESTHESIA PREPROCEDURE EVALUATION
Anesthesia Evaluation     Patient summary reviewed and Nursing notes reviewed   no history of anesthetic complications:  NPO Solid Status: > 8 hours  NPO Liquid Status: > 2 hours           Airway   Mallampati: II  TM distance: >3 FB  Neck ROM: full  Dental - normal exam     Pulmonary - negative pulmonary ROS   Cardiovascular - negative cardio ROS  Exercise tolerance: excellent (>7 METS)        Neuro/Psych- negative ROS  GI/Hepatic/Renal/Endo - negative ROS     Musculoskeletal (-) negative ROS    Abdominal    Substance History      OB/GYN    (+) Pregnant,         Other                        Anesthesia Plan    ASA 2     epidural   (I have reviewed the patient's history with the patient and the chart, including all pertinent laboratory results and imaging. Risks of neuraxial anesthesia were discussed with patient including but not limited to: inadequate block, bleeding, infection, persistent numbness or weakness, nerve damage, painful dysesthesia and spinal headache.  )    Anesthetic plan, all risks, benefits, and alternatives have been provided, discussed and informed consent has been obtained with: patient.        CODE STATUS:

## 2022-06-07 LAB
BASOPHILS # BLD AUTO: 0.04 10*3/MM3 (ref 0–0.2)
BASOPHILS NFR BLD AUTO: 0.3 % (ref 0–1.5)
DEPRECATED RDW RBC AUTO: 40.6 FL (ref 37–54)
EOSINOPHIL # BLD AUTO: 0.28 10*3/MM3 (ref 0–0.4)
EOSINOPHIL NFR BLD AUTO: 1.9 % (ref 0.3–6.2)
ERYTHROCYTE [DISTWIDTH] IN BLOOD BY AUTOMATED COUNT: 13.2 % (ref 12.3–15.4)
HCT VFR BLD AUTO: 31.5 % (ref 34–46.6)
HGB BLD-MCNC: 10.4 G/DL (ref 12–15.9)
IMM GRANULOCYTES # BLD AUTO: 0.11 10*3/MM3 (ref 0–0.05)
IMM GRANULOCYTES NFR BLD AUTO: 0.8 % (ref 0–0.5)
LYMPHOCYTES # BLD AUTO: 2.32 10*3/MM3 (ref 0.7–3.1)
LYMPHOCYTES NFR BLD AUTO: 16.1 % (ref 19.6–45.3)
MCH RBC QN AUTO: 27.9 PG (ref 26.6–33)
MCHC RBC AUTO-ENTMCNC: 33 G/DL (ref 31.5–35.7)
MCV RBC AUTO: 84.5 FL (ref 79–97)
MONOCYTES # BLD AUTO: 1.3 10*3/MM3 (ref 0.1–0.9)
MONOCYTES NFR BLD AUTO: 9 % (ref 5–12)
NEUTROPHILS NFR BLD AUTO: 10.32 10*3/MM3 (ref 1.7–7)
NEUTROPHILS NFR BLD AUTO: 71.9 % (ref 42.7–76)
NRBC BLD AUTO-RTO: 0 /100 WBC (ref 0–0.2)
PLATELET # BLD AUTO: 237 10*3/MM3 (ref 140–450)
PMV BLD AUTO: 10.5 FL (ref 6–12)
RBC # BLD AUTO: 3.73 10*6/MM3 (ref 3.77–5.28)
WBC NRBC COR # BLD: 14.37 10*3/MM3 (ref 3.4–10.8)

## 2022-06-07 PROCEDURE — 85025 COMPLETE CBC W/AUTO DIFF WBC: CPT | Performed by: STUDENT IN AN ORGANIZED HEALTH CARE EDUCATION/TRAINING PROGRAM

## 2022-06-07 PROCEDURE — 0503F POSTPARTUM CARE VISIT: CPT | Performed by: NURSE PRACTITIONER

## 2022-06-07 RX ADMIN — IBUPROFEN 600 MG: 600 TABLET, FILM COATED ORAL at 08:26

## 2022-06-07 RX ADMIN — DOCUSATE SODIUM 100 MG: 100 CAPSULE, LIQUID FILLED ORAL at 08:26

## 2022-06-07 RX ADMIN — FERROUS SULFATE TAB 325 MG (65 MG ELEMENTAL FE) 325 MG: 325 (65 FE) TAB at 08:26

## 2022-06-07 RX ADMIN — Medication: at 04:07

## 2022-06-07 RX ADMIN — IBUPROFEN 600 MG: 600 TABLET, FILM COATED ORAL at 16:11

## 2022-06-07 RX ADMIN — FERROUS SULFATE TAB 325 MG (65 MG ELEMENTAL FE) 325 MG: 325 (65 FE) TAB at 17:49

## 2022-06-07 RX ADMIN — DOCUSATE SODIUM 100 MG: 100 CAPSULE, LIQUID FILLED ORAL at 21:10

## 2022-06-07 RX ADMIN — Medication 1 APPLICATION: at 01:09

## 2022-06-07 NOTE — NURSING NOTE
Spoke with patient, father of baby and father of baby's mother regarding only one person being able to stay the night with patient. Also discussed that if father is staying he needs to change his clothes because he can not come in the hospital smelling like marijuana. Informed all that it is not good for the baby to be around it and it is not fair to the other patients. Father of baby verbalized understanding.

## 2022-06-07 NOTE — PROGRESS NOTES
Postpartum Progress Note      Status post Vaginal Delivery: Doing well postoperatively.     1) Postpartum care immediately following delivery :    Routine care, continue current care. Plan for discharge home tomorrow on PPD 2  2) Maternal anemia: Hgb 11.8 on admission. AM CBC is pending. QBL 274cc. Continue oral ferrous sulfate.     Rh status: A+  Rubella: Immune  Gender: Female    Subjective     Postpartum Day 1: Vaginal delivery    The patient feels well. The patient denies emotional concerns. Pain is well controlled with current medications. The baby is well. The patient is ambulating well. The patient is tolerating a normal diet.     Objective     Vital signs in last 24 hours:  Temp:  [97.6 °F (36.4 °C)-99.2 °F (37.3 °C)] 97.6 °F (36.4 °C)  Heart Rate:  [] 76  Resp:  [16-18] 18  BP: ()/(51-80) 109/69      General:    alert, appears stated age and cooperative   CV: RRR, no m/r/g   Lungs: CTAB   Abdomen:  Soft, Non-tender    Lochia:  appropriate   Uterine Fundus:   firm   Ext    Edema trace   DVT Evaluation:  No evidence of DVT seen on physical exam.     Lab Results   Component Value Date    WBC 13.54 (H) 06/06/2022    HGB 11.8 (L) 06/06/2022    HCT 34.3 06/06/2022    MCV 83.1 06/06/2022     06/06/2022       Kaylah Otto, APRN  6/7/2022  09:10 EDT

## 2022-06-07 NOTE — LACTATION NOTE
Mom reports she is formula feeding baby because BF hurts. Offered to assist mom if wanting assistance. She reports someone is bringing in a pump for her. Encouraged mom to call LC as needed. Educated on benefits of BF    Lactation Consult Note    Evaluation Completed: 2022 08:19 EDT  Patient Name: Anita Lazcano  :  2000  MRN:  [id number]     REFERRAL  INFORMATION:                                         DELIVERY HISTORY:        Skin to skin initiation date/time: 2022  8:00 PM   Skin to skin end date/time: 2022  8:59 PM        MATERNAL ASSESSMENT:                               INFANT ASSESSMENT:  Information for the patient's :  Dwain Lazcanowallace [[id number]]   No past medical history on file.                                                                                                     MATERNAL INFANT FEEDING:                                                                       EQUIPMENT TYPE:                                 BREAST PUMPING:          LACTATION REFERRALS:

## 2022-06-07 NOTE — ANESTHESIA POSTPROCEDURE EVALUATION
"Patient: Anita Lazcano    Procedure Summary     Date: 06/06/22 Room / Location:     Anesthesia Start: 1705 Anesthesia Stop: 1958    Procedure: LABOR ANALGESIA Diagnosis:     Scheduled Providers:  Provider: Mal Cintron MD    Anesthesia Type: epidural ASA Status: 2          Anesthesia Type: epidural    Vitals  Vitals Value Taken Time   /69 06/06/22 2045   Temp 37.1 °C (98.7 °F) 06/06/22 2015   Pulse 89 06/06/22 2045   Resp 16 06/06/22 2045   SpO2 100 % 06/06/22 1954   Vitals shown include unvalidated device data.        Post Anesthesia Care and Evaluation    Patient location during evaluation: bedside  Patient participation: complete - patient participated  Level of consciousness: awake and alert  Pain management: adequate  Airway patency: patent  Anesthetic complications: No anesthetic complications  PONV Status: none  Cardiovascular status: acceptable  Respiratory status: acceptable  Hydration status: acceptable    Comments: /69   Pulse 89   Temp 37.1 °C (98.7 °F) (Oral)   Resp 16   Ht 157.5 cm (62\")   Wt 72.1 kg (159 lb)   LMP 09/04/2021   SpO2 100%   Breastfeeding No   BMI 29.08 kg/m²   Available for any issues   No anesthesia care post op    "

## 2022-06-07 NOTE — LACTATION NOTE
This note was copied from a baby's chart.  P1, T baby- new admission. Mom is planning to BF and formula feed. Informed PT that LC is here to help with BF tonight. Offered assistance but mother declined, said she will call later, when baby is due to eat if she need help. Reports infant has been very sleepy but latched for few minutes. Encouraged always to offer breast first and then bottle. Educated on the importance of stimulation for adequate milk supply. PT denies any questions and concerns at this time. Mom has PBP. Encouraged to call LC if needing further assistance.

## 2022-06-07 NOTE — L&D DELIVERY NOTE
[facility]  Vaginal Delivery Note   Review the Delivery Report for details.       Procedure: Spontaneous vaginal delivery    Surgeon: Tiffanie Boyd MD    Preop diagnosis:  22 y.o.  year old  in active labor at 39w2d with pregnancy complicated by maternal anemia, GBS positive status, tobacco use in pregnancy       Postop diagnosis: Same    Indications: Anita Lazcano is a 22 y.o.  at 39w2d who presented to L&D with contractions. She was evaluated in the OB ED and noted to be 4-5/80-90/-2. She was thus admitted for management of labor. She continued to progress in labor and had an epidural placed for pain control. Once comfortable, she was rechecked and continued to 7-8 cm dilated. Approximately an hour later, she was reevaluated and noted to be 9-10/90/0 and underwent amniotomy at 1851 with clear fluid noted. She progressed to complete dilation 30 minutes later and started to push. I was then called to bedside after less than 20 minutes of pushing for delivery.     Findings: Female infant, Apgar 8/9, Weight 3466 g (8 lbs 0.5 oz); 2nd degree perineal lacerations noted and repaired    Anesthesia: Epidural     QBL: 274 cc    Placenta: Delivered spontaneously intact and discarded.    Cord gases: none     Complications: None    Procedure:The cervix was noted to be completely dilated, completely effaced, and +3 station. Under continuous fetal heart rate monitoring, the patient was encouraged to push. With good maternal effort she delivered a viable female infant. The head presented in the OA presentation and restituted to SURESH. There was no nuchal cord present. The right anterior fetal shoulder was then easily delivered with a gentle downward motion followed by the posterior fetal shoulder, followed by the remainder of the infant without difficulty. The infant was immediately vigorous. The cord was clamped roughly 60 seconds following delivery. The cord was cut and the infant was placed on the mother's  chest. Cord blood was then collected. The placenta then delivered spontaneously intact, and a three vessel cord was noted. Uterine message and pitocin 20 units IV was given until the fundus was firm. The cervix, vagina, perineum, and rectum were carefully inspected for lacerations and 2nd degree perineal noted. The 2nd degree perineal laceration was repaired with a running locked 3-0 vicryl and the skin repaired with 3-0 vicryl in subcuticular fashion. A figure of eight suture using 3-0 vicryl was then placed at site of repair to achieve hemostasis. Counts for needles, sponges, laps and instruments were correct times two at the end of the delivery. There were no sponges left in the vagina. I was present and scrubbed for the entire delivery. There were no major complications. Mother and baby were bonding well at the end of the delivery.      Delivery     Delivery: Vaginal, Spontaneous     YOB: 2020    Time of Birth:  Gestational Age 7:58 PM   39w2d     Anesthesia: Epidural     Delivering clinician: Tiffanie Boyd    Forceps?   No   Vacuum? No    Shoulder dystocia present: No         Infant    Findings: female  infant     Infant observations: Weight: 3644 g (8 lb 0.5 oz)   Length: 20  in  Observations/Comments:  scale #4      Apgars: 8  @ 1 minute /    9  @ 5 minutes   Infant Name: [place]     Placenta, Cord, and Fluid    Placenta delivered  Spontaneous  at   6/6/2022  8:02 PM     Cord: 3 vessels  present.   Nuchal Cord?  no   Cord blood obtained: Yes    Cord gases obtained:  No    Cord gas results: Venous:  No results found for: PHCVEN    Arterial:  No results found for: PHCART     Repair    Episiotomy: None     No    Lacerations: Yes  Laceration Information  Laceration Repaired?   Perineal: 2nd  Yes    Periurethral:       Labial:       Sulcus:       Vaginal:       Cervical:         Suture used for repair: 3-0 Vicryl   Estimated Blood Loss:               Quantitative Blood Loss: Quantitative Blood Loss  (mL): 274 mL (06/06/22 2036)                Complications  none    Disposition  Mother to Mother Baby/Postpartum  in stable condition currently.  Baby to remains with mom  in stable condition currently.      Tiffanie Boyd MD  06/06/22  21:28 EDT

## 2022-06-08 VITALS
HEART RATE: 65 BPM | OXYGEN SATURATION: 99 % | HEIGHT: 62 IN | DIASTOLIC BLOOD PRESSURE: 67 MMHG | WEIGHT: 159 LBS | RESPIRATION RATE: 18 BRPM | TEMPERATURE: 97.9 F | BODY MASS INDEX: 29.26 KG/M2 | SYSTOLIC BLOOD PRESSURE: 109 MMHG

## 2022-06-08 PROCEDURE — 0503F POSTPARTUM CARE VISIT: CPT | Performed by: OBSTETRICS & GYNECOLOGY

## 2022-06-08 PROCEDURE — 90715 TDAP VACCINE 7 YRS/> IM: CPT | Performed by: OBSTETRICS & GYNECOLOGY

## 2022-06-08 PROCEDURE — 90471 IMMUNIZATION ADMIN: CPT | Performed by: OBSTETRICS & GYNECOLOGY

## 2022-06-08 PROCEDURE — 25010000002 TETANUS-DIPHTH-ACELL PERTUSSIS 5-2.5-18.5 LF-MCG/0.5 SUSPENSION PREFILLED SYRINGE: Performed by: OBSTETRICS & GYNECOLOGY

## 2022-06-08 RX ORDER — IBUPROFEN 600 MG/1
600 TABLET ORAL EVERY 8 HOURS PRN
Qty: 30 TABLET | Refills: 0 | Status: SHIPPED | OUTPATIENT
Start: 2022-06-08 | End: 2022-08-23

## 2022-06-08 RX ORDER — PSEUDOEPHEDRINE HCL 30 MG
100 TABLET ORAL 2 TIMES DAILY
Qty: 20 CAPSULE | Refills: 0 | Status: SHIPPED | OUTPATIENT
Start: 2022-06-08 | End: 2022-08-23

## 2022-06-08 RX ADMIN — IBUPROFEN 600 MG: 600 TABLET, FILM COATED ORAL at 11:40

## 2022-06-08 RX ADMIN — FERROUS SULFATE TAB 325 MG (65 MG ELEMENTAL FE) 325 MG: 325 (65 FE) TAB at 08:02

## 2022-06-08 RX ADMIN — TETANUS TOXOID, REDUCED DIPHTHERIA TOXOID AND ACELLULAR PERTUSSIS VACCINE, ADSORBED 0.5 ML: 5; 2.5; 8; 8; 2.5 SUSPENSION INTRAMUSCULAR at 11:40

## 2022-06-08 RX ADMIN — DOCUSATE SODIUM 100 MG: 100 CAPSULE, LIQUID FILLED ORAL at 08:02

## 2022-06-08 RX ADMIN — IBUPROFEN 600 MG: 600 TABLET, FILM COATED ORAL at 01:00

## 2022-06-08 NOTE — PLAN OF CARE
Problem: Adult Inpatient Plan of Care  Goal: Plan of Care Review  Outcome: Met  Flowsheets (Taken 6/8/2022 0856)  Progress: improving  Plan of Care Reviewed With: patient  Goal: Patient-Specific Goal (Individualized)  Outcome: Met  Goal: Absence of Hospital-Acquired Illness or Injury  Outcome: Met  Intervention: Identify and Manage Fall Risk  Recent Flowsheet Documentation  Taken 6/8/2022 0800 by Anais Emmanuel RN  Safety Promotion/Fall Prevention: safety round/check completed  Intervention: Prevent and Manage VTE (Venous Thromboembolism) Risk  Recent Flowsheet Documentation  Taken 6/8/2022 0800 by Anais Emmanuel RN  Activity Management: up ad oren  Goal: Optimal Comfort and Wellbeing  Outcome: Met  Intervention: Monitor Pain and Promote Comfort  Recent Flowsheet Documentation  Taken 6/8/2022 0800 by Anais Emmanuel RN  Pain Management Interventions: see MAR  Intervention: Provide Person-Centered Care  Recent Flowsheet Documentation  Taken 6/8/2022 0800 by Anais Emmanuel RN  Trust Relationship/Rapport:   care explained   choices provided   questions answered   questions encouraged  Goal: Readiness for Transition of Care  Outcome: Met  Intervention: Mutually Develop Transition Plan  Recent Flowsheet Documentation  Taken 6/8/2022 0854 by Anais Emmanuel RN  Equipment Needed After Discharge: none  Equipment Currently Used at Home: none  Anticipated Changes Related to Illness: none  Transportation Anticipated: family or friend will provide  Concerns to be Addressed: no discharge needs identified  Readmission Within the Last 30 Days: no previous admission in last 30 days  Patient/Family Anticipated Services at Transition: none  Patient/Family Anticipates Transition to:   home   home with family   Goal Outcome Evaluation:  Plan of Care Reviewed With: patient        Progress: improving

## 2022-06-08 NOTE — PLAN OF CARE
Goal Outcome Evaluation:  Plan of Care Reviewed With: patient        Progress: improving     Patient doing well, VSS, bleeding, fundus, wdl.  Requesting TDap today.  Anticipate d/c in the am

## 2022-06-08 NOTE — DISCHARGE SUMMARY
Date of Discharge:  2022    Discharge Diagnosis: 1.  Intrauterine pregnancy at 39-2/7 weeks, delivered    Presenting Problem/History of Present Illness  Pregnancy [Z34.90]       Hospital Course  Patient is a 22 y.o. female presented with contractions at 39-2/7 weeks.  She progressed to spontaneous vaginal delivery of a vigorous female .  For event surrounding delivery, please see delivery note.  Postpartum course was smooth.  By postpartum day #2, the patient was afebrile, tolerating a regular diet and her lochia was minimal.  At this point, the patient requested discharge home and I felt that she was stable for this..      Procedures Performed         Consults:   Consults     No orders found from 2022 to 2022.              Condition on Discharge: Stable    Vital Signs  Temp:  [97.9 °F (36.6 °C)-98.2 °F (36.8 °C)] 97.9 °F (36.6 °C)  Heart Rate:  [65-76] 65  Resp:  [16-18] 18  BP: ()/(57-68) 109/67    Physical Exam:   The abdomen is soft and nondistended.  Fundus is firm and nontender.  It is below the umbilicus.  Extremities are equal in size with minimal pedal edema.    Discharge Disposition  Home or Self Care    Discharge Medications     Discharge Medications      New Medications      Instructions Start Date   docusate sodium 100 MG capsule   100 mg, Oral, 2 Times Daily      ibuprofen 600 MG tablet  Commonly known as: ADVIL,MOTRIN   600 mg, Oral, Every 8 Hours PRN         Continue These Medications      Instructions Start Date   nicotine 7 MG/24HR patch  Commonly known as: NICODERM CQ   1 patch, Transdermal, Every 24 Hours      prenatal vitamin 28-0.8 28-0.8 MG tablet tablet   No dose, route, or frequency recorded.         Stop These Medications    ferrous sulfate 325 (65 FE) MG tablet            Discharge Diet:     Activity at Discharge:     Follow-up Appointments  No future appointments.  Additional Instructions for the Follow-ups that You Need to Schedule     Call MD With Problems /  Concerns   As directed      Instructions: Call for fever, severe abdominal pain, heavy bleeding or any other concerns    Order Comments: Instructions: Call for fever, severe abdominal pain, heavy bleeding or any other concerns          Discharge Follow-up with Specified Provider: Dr Boyd; 6 Weeks   As directed      To: Dr Boyd    Follow Up: 6 Weeks               Test Results Pending at Discharge       Vladimir Gross MD  06/08/22  11:30 EDT    Time: Discharge 15 min

## 2022-06-08 NOTE — LACTATION NOTE
This note was copied from a baby's chart.  PT is going home today. Mom reports baby is BF some, but she also continues to supplement with formula. Reminded her on the importance of stimulation for adequate milk supply. Educated on baby's expected output and weight gain. Gave OPLC, zoom and mommy and Me info. Discussed engorgement and mastitis.  The whole time during education PT is not listening and it's turning her back to LC.When asked if she has any questions she declines any questions and concerns . Encouraged to call LC if needing further assistance.

## 2022-06-08 NOTE — PROGRESS NOTES
Discharge Planning Assessment  [facility]     Patient Name: Anita Lazcano  MRN: [id number]  Today's Date: 6/8/2022    Admit Date: 6/6/2022     Discharge Needs Assessment    No documentation.                Discharge Plan     Row Name 06/08/22 0926       Plan    Plan Infant to discharge home with mother and CSW will continue following infant’s cord for toxicology results. Clara DREW, CSW    Plan Comments Mother: Anita Lazcano MRN: [id number]; Infant: Anderson Lazcano MRN: [id number]; CSW was consulted for “maternal hx of gonorrhea, chlamydia, trich 2019. urine missed on infant will send cord.” Neither mother nor infant had a UDS collected at admission. However, infant’s cord toxicology has been sent for screening and CSW will continue following for results and complete mandated CPS reporting if warranted. CSW met with mother at bedside to conduct consult. Infant’s father and paternal grandmother were also present and mother gave permission to CSW for CSW to speak with her while they were present. Mother verified her phone number, insurance provider and informed CSW that the current address “[address] [place] [address]” is no longer where she will be staying or receiving her mail. Mother provided CSW with a new address “[address] [place] [address]. CSW asked mother if she would like for CSW to update this information in her and infants chart and she said yes. CSW has updated mother and infant’s address information. Mother also plans to add infant to her insurance plan and she has already met with [employer]. Mother was enrolled in WIC during pregnancy and plans on enrolling infant after discharge. Mother’s support system consists of infant’s paternal family. Mother plans to take infant to see pediatrician Dr. Hunt and she is comfortable scheduling infant’s appointments and has transportation to them as well. Mother verified that infant has a car seat, crib/bassinet and other necessary  supplies needed for infant (clothing, bottles, diapers, etc.). CSW provided mother with a mother/baby resource packet and briefly went over the information with her. The packet contained information on: WIC, HANDS, PPD, counseling/support groups, financial assistance, etc. CSW asked mother if she had any additional needs or concerns and mother declined. Throughout the consult mother was pleasant, appropriate and cooperative with CSW. CSW will remain available as needed throughout mother and infant’s hospital admission. LISA Ryder              Continued Care and Services - Admitted Since 6/6/2022    Coordination has not been started for this encounter.     Selected Continued Care - Episodes Includes selections from active Coordinated Care Management episodes    Motherhood Connection Episode start date: 3/26/2022   There are no active outsourced providers for this episode.                  Demographic Summary     Row Name 06/08/22 0926       General Information    Admission Type inpatient    Arrived From home    Referral Source nursing    Reason for Consult psychosocial concerns;community resources               Functional Status     Row Name 06/08/22 0926       Mental Status    General Appearance WDL WDL               Psychosocial     Row Name 06/08/22 0926       Behavior WDL    Behavior WDL WDL       Emotion Mood WDL    Emotion/Mood/Affect WDL WDL       Speech WDL    Speech WDL WDL       Perceptual State WDL    Perceptual State WDL WDL       Thought Process WDL    Thought Process WDL WDL       Intellectual Performance WDL    Intellectual Performance WDL WDL       Coping/Stress    Major Change/Loss/Stressor birth               Abuse/Neglect     Row Name 06/08/22 0926       Personal Safety    Physical Signs of Abuse Present no               Legal    No documentation.                Substance Abuse    No documentation.                Patient Forms    No documentation.                   AZEEM Corral

## 2022-06-17 NOTE — PROGRESS NOTES
Continued Stay Note  [facility]     Patient Name: Anita Lazcano  MRN: [id number]  Today's Date: 6/17/2022    Admit Date: 6/6/2022     Discharge Plan     Row Name 06/17/22 0846       Plan    Plan Comments Mother: Anita Lazcano MRN: [id number]; Infant: Anderson “Ce” Jaleesa MRN: [id number]; CSW has reviewed infant’s cord toxicology results and they were negative. Mandated CPS reporting is not required at this time. LISA Ryder               Discharge Codes    No documentation.               Expected Discharge Date and Time     Expected Discharge Date Expected Discharge Time    Jun 8, 2022             AZEEM Corral

## 2022-07-28 ENCOUNTER — TELEPHONE (OUTPATIENT)
Dept: OBSTETRICS AND GYNECOLOGY | Facility: CLINIC | Age: 22
End: 2022-07-28

## 2022-08-19 ENCOUNTER — TELEPHONE (OUTPATIENT)
Dept: OBSTETRICS AND GYNECOLOGY | Facility: CLINIC | Age: 22
End: 2022-08-19

## 2022-08-23 ENCOUNTER — POSTPARTUM VISIT (OUTPATIENT)
Dept: OBSTETRICS AND GYNECOLOGY | Facility: CLINIC | Age: 22
End: 2022-08-23

## 2022-08-23 VITALS
WEIGHT: 124 LBS | BODY MASS INDEX: 22.82 KG/M2 | HEIGHT: 62 IN | SYSTOLIC BLOOD PRESSURE: 112 MMHG | DIASTOLIC BLOOD PRESSURE: 74 MMHG

## 2022-08-23 DIAGNOSIS — Z30.09 BIRTH CONTROL COUNSELING: ICD-10-CM

## 2022-08-23 DIAGNOSIS — F31.11 BIPOLAR AFFECTIVE DISORDER, CURRENTLY MANIC, MILD: ICD-10-CM

## 2022-08-23 PROCEDURE — 99213 OFFICE O/P EST LOW 20 MIN: CPT | Performed by: STUDENT IN AN ORGANIZED HEALTH CARE EDUCATION/TRAINING PROGRAM

## 2022-08-23 RX ORDER — LAMOTRIGINE 25 MG/1
25 TABLET ORAL DAILY
Qty: 30 TABLET | Refills: 1 | Status: SHIPPED | OUTPATIENT
Start: 2022-08-23 | End: 2022-11-01

## 2022-08-24 ENCOUNTER — TELEPHONE (OUTPATIENT)
Dept: OBSTETRICS AND GYNECOLOGY | Facility: CLINIC | Age: 22
End: 2022-08-24

## 2022-08-24 NOTE — TELEPHONE ENCOUNTER
L/m for pt to call regarding referral for counseling services.     Please encourage pt to call one of the following facilities at her earliest convenience to schedule:    [facility] [phone]   or  [facility] [phone]    Pt # [phone]

## 2022-09-07 ENCOUNTER — TELEPHONE (OUTPATIENT)
Dept: OBSTETRICS AND GYNECOLOGY | Facility: CLINIC | Age: 22
End: 2022-09-07

## 2022-09-07 NOTE — TELEPHONE ENCOUNTER
Patient is calling and states she was advised to let provider know when she starts her cycle. She is scheduled for IUD insertion next Wednesday 9/13, but would like to instead have it inserted while on her cycle. Is she able to be scheduled with you at all this week for insertion while on cycle, or would you like pt to keep 9/13 insertion date.     Please adviseClara

## 2022-09-26 ENCOUNTER — TELEPHONE (OUTPATIENT)
Dept: OBSTETRICS AND GYNECOLOGY | Facility: CLINIC | Age: 22
End: 2022-09-26

## 2022-09-26 NOTE — TELEPHONE ENCOUNTER
Dr. Boyd, would you like patient to come in and leave a urine sample to confirm if it is a UTI or not? Or can she receive a medication?    Please advise,   Thank you

## 2022-09-26 NOTE — TELEPHONE ENCOUNTER
Caller: Anita Lazcano     Relationship: SELF    Best call back number: [phone]    What is your medical concern? UTI SYMPTOMS     How long has this issue been going on? HAVING PELVIC PAIN, PRESSURE WHEN SHE HAS TO URINATE, SLIGHT BURNING AND FEELS LIKE ONLY A SMALL AMOUNT COMES OUT.     Is your provider already aware of this issue? SHE HAS HAD THESE FREQUENTLY    Have you been treated for this issue? IN THE PAST. SHE WOULD LIKE TO KNOW IF YOU CAN PLEASE CALL HER IN SOMETHING FOR THIS. PLEASE CALL AND LET HER KNOW IF/WHEN SOMETHING IS CALLED IN. SHE CAN BE REACHED AT ANYTIME AND YOU CAN LEAVE A VM ALSO. IF YOU GIVE HER ANTIBIOTIC SHE WILL ALSO NEED SOMETHING FOR A YEAST INFECTION.

## 2022-09-27 ENCOUNTER — CLINICAL SUPPORT (OUTPATIENT)
Dept: OBSTETRICS AND GYNECOLOGY | Facility: CLINIC | Age: 22
End: 2022-09-27

## 2022-09-27 DIAGNOSIS — R10.2 PELVIC PAIN: Primary | ICD-10-CM

## 2022-09-27 LAB
BILIRUB BLD-MCNC: NEGATIVE MG/DL
CLARITY, POC: ABNORMAL
COLOR UR: YELLOW
GLUCOSE UR STRIP-MCNC: NEGATIVE MG/DL
KETONES UR QL: NEGATIVE
LEUKOCYTE EST, POC: NEGATIVE
NITRITE UR-MCNC: NEGATIVE MG/ML
PH UR: 6.5 [PH] (ref 5–8)
PROT UR STRIP-MCNC: NEGATIVE MG/DL
RBC # UR STRIP: NEGATIVE /UL
SP GR UR: 1.02 (ref 1–1.03)
UROBILINOGEN UR QL: ABNORMAL

## 2022-09-27 PROCEDURE — 81002 URINALYSIS NONAUTO W/O SCOPE: CPT | Performed by: STUDENT IN AN ORGANIZED HEALTH CARE EDUCATION/TRAINING PROGRAM

## 2022-09-29 ENCOUNTER — TELEPHONE (OUTPATIENT)
Dept: OBSTETRICS AND GYNECOLOGY | Facility: CLINIC | Age: 22
End: 2022-09-29

## 2022-09-29 LAB
BACTERIA UR CULT: NORMAL
BACTERIA UR CULT: NORMAL

## 2022-09-29 NOTE — TELEPHONE ENCOUNTER
----- Message from Tiffanie Boyd MD sent at 9/29/2022  9:16 AM EDT -----  Please call and let her know that her urine sample showed no signs of infection. Please see if she is still having symptoms. Thanks!

## 2022-09-29 NOTE — TELEPHONE ENCOUNTER
Spoke to  Anita to let her know that Dr Boyd said that your urine sample shows no sign of infection. I asked her if she is still having symptoms and she is. I told her I will let Dr Boyd know. Thank you.

## 2022-09-29 NOTE — PROGRESS NOTES
Spoke with Anita, results given. She is still having symptoms. [facility] pharmacy on file. Thank you.

## 2022-09-30 ENCOUNTER — TELEPHONE (OUTPATIENT)
Dept: OBSTETRICS AND GYNECOLOGY | Facility: CLINIC | Age: 22
End: 2022-09-30

## 2022-09-30 DIAGNOSIS — R39.9 UTI SYMPTOMS: Primary | ICD-10-CM

## 2022-09-30 RX ORDER — PHENAZOPYRIDINE HYDROCHLORIDE 200 MG/1
200 TABLET, FILM COATED ORAL 3 TIMES DAILY PRN
Qty: 6 TABLET | Refills: 0 | Status: SHIPPED | OUTPATIENT
Start: 2022-09-30 | End: 2022-10-02

## 2022-09-30 NOTE — TELEPHONE ENCOUNTER
----- Message from Tiffanie Boyd MD sent at 9/30/2022 12:12 PM EDT -----  Please let her know that I have sent her a prescription for pyridium to see if it helps her symptoms. Thanks!

## 2022-09-30 NOTE — TELEPHONE ENCOUNTER
Spoke with Anita to let her know that Dr Boyd has sent in a prescription for pyridium to help with your symptoms. This medication turns your urine orange and can stain your underwear, so you may want to wear a panty liner. This medication will help to relax the bladder and calm the symptoms you are having. It is not an antibiotic and will not cause a yeast infection. It was sent to your [facility] pharmacy. Thank you.

## 2022-10-05 ENCOUNTER — OFFICE VISIT (OUTPATIENT)
Dept: OBSTETRICS AND GYNECOLOGY | Facility: CLINIC | Age: 22
End: 2022-10-05

## 2022-10-05 VITALS
HEIGHT: 62 IN | DIASTOLIC BLOOD PRESSURE: 70 MMHG | WEIGHT: 123 LBS | SYSTOLIC BLOOD PRESSURE: 116 MMHG | BODY MASS INDEX: 22.63 KG/M2

## 2022-10-05 DIAGNOSIS — Z30.09 BIRTH CONTROL COUNSELING: Primary | ICD-10-CM

## 2022-10-05 DIAGNOSIS — Z32.02 ENCOUNTER FOR PREGNANCY TEST WITH RESULT NEGATIVE: ICD-10-CM

## 2022-10-05 LAB
B-HCG UR QL: NEGATIVE
EXPIRATION DATE: NORMAL
INTERNAL NEGATIVE CONTROL: NEGATIVE
INTERNAL POSITIVE CONTROL: POSITIVE
Lab: NORMAL

## 2022-10-05 PROCEDURE — 81025 URINE PREGNANCY TEST: CPT | Performed by: STUDENT IN AN ORGANIZED HEALTH CARE EDUCATION/TRAINING PROGRAM

## 2022-10-05 PROCEDURE — 99213 OFFICE O/P EST LOW 20 MIN: CPT | Performed by: STUDENT IN AN ORGANIZED HEALTH CARE EDUCATION/TRAINING PROGRAM

## 2022-10-05 RX ORDER — NORGESTIMATE AND ETHINYL ESTRADIOL 0.25-0.035
1 KIT ORAL DAILY
Qty: 84 TABLET | Refills: 3 | Status: SHIPPED | OUTPATIENT
Start: 2022-10-05 | End: 2022-11-01

## 2022-10-12 ENCOUNTER — TELEPHONE (OUTPATIENT)
Dept: OBSTETRICS AND GYNECOLOGY | Facility: CLINIC | Age: 22
End: 2022-10-12

## 2022-10-12 NOTE — TELEPHONE ENCOUNTER
Pt called to report she has noticed hair loss since starting bc pill one week ago.  Asking if this is normal with birth control pills, or more of a postpartum issue (delivered 6/6/22).  Also asking what can she about this?    Pt # [phone]

## 2022-10-25 NOTE — PROGRESS NOTES
"Chief Complaint   Patient presents with   • Contraception     Patient here for Mirena insertion. Office supplied.  LOT [id number]  EXP  2024        SUBJECTIVE:     Anita Lazcano is a 22 y.o.  who presents for Mirena IUD insertion. The patient desired an IUD insertion today but was concerned regarding the discomfort associated with insertion and wished to no longer proceed with insertion. She would like to do pills instead. She denies history of hypertension, liver disease, migraines with aura, or VTE disease. She started her most recent period on 10/3/22.      Past Medical History:   Diagnosis Date   • Chlamydia 2019   • Gonorrhea 2019   • Urogenital trichomoniasis 2019      No past surgical history on file.   Social History     Tobacco Use   • Smoking status: Former   • Smokeless tobacco: Current   Vaping Use   • Vaping Use: Every day   • Start date: 2021   • Substances: Nicotine   Substance Use Topics   • Alcohol use: Not Currently   • Drug use: Not Currently     OB History    Para Term  AB Living   1 1 1     1   SAB IAB Ectopic Molar Multiple Live Births           0 1      # Outcome Date GA Lbr Gilberto/2nd Weight Sex Delivery Anes PTL Lv   1 Term 22 39w2d 12:30 / 00:28 3644 g (8 lb 0.5 oz) F Vag-Spont EPI N MYCHAL      Birth Comments: scale #4        Review of Systems   All other systems reviewed and are negative.      OBJECTIVE:   Vitals:    10/05/22 1430   BP: 116/70   Weight: 55.8 kg (123 lb)   Height: 157.5 cm (62\")        Physical Exam  Vitals reviewed.   Constitutional:       General: She is not in acute distress.  HENT:      Head: Normocephalic and atraumatic.      Right Ear: External ear normal.      Left Ear: External ear normal.   Eyes:      Extraocular Movements: Extraocular movements intact.      Pupils: Pupils are equal, round, and reactive to light.   Pulmonary:      Effort: Pulmonary effort is normal. No respiratory distress.   Musculoskeletal:         General: No " deformity. Normal range of motion.      Cervical back: Normal range of motion and neck supple.   Skin:     General: Skin is warm and dry.   Neurological:      General: No focal deficit present.      Mental Status: She is alert and oriented to person, place, and time.   Psychiatric:         Mood and Affect: Mood normal.         Behavior: Behavior normal.       UPT: negative     ASSESSMENT:     ICD-10-CM ICD-9-CM   1. Birth control counseling  Z30.09 V25.09   2. Encounter for pregnancy test with result negative  Z32.02 V72.41       PLAN:   I discussed the IUD insertion process and reviewed risks of insertion but the patient was no longer interested in an IUD today and would prefer to use oral contraceptive pills. Risks, benefits, alternatives discussed at length. Risks of venous thromboembolic complications discussed. Instructions for use and start today or tomorrow. Discussed condoms in first week for backup method. Also discussed condoms for STD prevention. All questions answered. Rx for Ortho-cyclen given. If the patient changes her mind and would like to have IUD inserted, I advised her to contact the office.     Orders Placed This Encounter   Procedures   • POC Pregnancy, Urine     Order Specific Question:   Release to patient     Answer:   Routine Release      Follow up as needed.    Tiffanie Boyd MD

## 2022-11-01 ENCOUNTER — PROCEDURE VISIT (OUTPATIENT)
Dept: OBSTETRICS AND GYNECOLOGY | Facility: CLINIC | Age: 22
End: 2022-11-01

## 2022-11-01 VITALS
HEIGHT: 62 IN | DIASTOLIC BLOOD PRESSURE: 61 MMHG | SYSTOLIC BLOOD PRESSURE: 100 MMHG | BODY MASS INDEX: 22.63 KG/M2 | HEART RATE: 65 BPM | WEIGHT: 123 LBS

## 2022-11-01 DIAGNOSIS — A63.0 GENITAL WARTS: ICD-10-CM

## 2022-11-01 DIAGNOSIS — Z30.430 ENCOUNTER FOR INSERTION OF MIRENA IUD: Primary | ICD-10-CM

## 2022-11-01 PROCEDURE — 17110 DESTRUCTION B9 LES UP TO 14: CPT | Performed by: STUDENT IN AN ORGANIZED HEALTH CARE EDUCATION/TRAINING PROGRAM

## 2022-11-01 PROCEDURE — 81025 URINE PREGNANCY TEST: CPT | Performed by: STUDENT IN AN ORGANIZED HEALTH CARE EDUCATION/TRAINING PROGRAM

## 2022-11-01 PROCEDURE — 58300 INSERT INTRAUTERINE DEVICE: CPT | Performed by: STUDENT IN AN ORGANIZED HEALTH CARE EDUCATION/TRAINING PROGRAM

## 2022-11-01 NOTE — PROGRESS NOTES
Mirena IUD Insertion Procedure Note    Indications: Contraception    Pre Procedural Diagnosis: Encounter for Mirena intrauterine device insertion    Post Procedural Diagnosis: Same    Counseling:  Patient was counseled on risks of IUD insertion including but not limited to abnormal bleeding, infection, uterine perforation, expulsion, failure of contraception resulting in pregnancy, need for additional procedures and/or need for surgery.  All questions were answered to apparent satisfaction.  She was counseled about the duration of treatment, recommended removal in 8 years.  She was advised to perform monthly string checks and to see evaluation with unexpected changes in bleeding patterns and/or unable to feel the strings.      Procedure Details   Urine pregnancy test was done, and result was negative.  Gonorrhea/chlamydia testing was not done.    Bimanual exam revealed Anteverted. Cervix cleansed with Betadine. Tenaculum applied to the anterior lip.  Uterus sounded to 7 cm. IUD inserted without difficulty. String visible and trimmed. Patient tolerated procedure well.    IUD Information:  See medication record.    Condition:  Stable    Complications:  None    Plan:    The patient was advised to call for any fever or for prolonged or severe pain or bleeding; she was also advised to use a back up method of contraception for 7 days or abstain from intercourse. She was advised to use ibuprofen as needed for mild to moderate pain.  Return to the clinic in 4 weeks for follow-up.    Tiffanie Boyd MD

## 2022-11-01 NOTE — PROGRESS NOTES
Procedure   Procedures    Subjective   Anita Lazcano is a 22 y.o. female who needs treatment of genital warts. Areas are typical in appearance for condyloma acuminata.     Objective    Skin: 3 warts noted with 2 on right buttocks near the perineum and 1 on the left buttocks near the perineum. Size range is <0.4 cm.     Assessment/Plan   Genital warts (condyloma acuminata)    1. TCA was applied to 3 warts until each lesion turned white.   2. Patient tolerated procedure well.   3. Discussed other options for treatment including possible need for biopsy or excision without improvement.    Tiffanie Boyd MD

## 2022-12-01 ENCOUNTER — TELEPHONE (OUTPATIENT)
Dept: OBSTETRICS AND GYNECOLOGY | Facility: CLINIC | Age: 22
End: 2022-12-01

## 2022-12-28 ENCOUNTER — TELEPHONE (OUTPATIENT)
Dept: OBSTETRICS AND GYNECOLOGY | Facility: CLINIC | Age: 22
End: 2022-12-28

## 2022-12-28 NOTE — TELEPHONE ENCOUNTER
Caller: Anita Lazcano    Relationship to patient: Self    Best call back number: [phone]    Patient is needing: PT CANCELLED SAME DAY APPT AT 11:15 WITH DR. CARBAJAL. RESCHEDULED FOR 1/27/23.

## 2023-01-31 ENCOUNTER — TELEPHONE (OUTPATIENT)
Dept: OBSTETRICS AND GYNECOLOGY | Facility: CLINIC | Age: 23
End: 2023-01-31
Payer: MEDICAID

## 2023-02-27 ENCOUNTER — TELEPHONE (OUTPATIENT)
Dept: OBSTETRICS AND GYNECOLOGY | Facility: CLINIC | Age: 23
End: 2023-02-27
Payer: MEDICAID

## 2023-03-30 ENCOUNTER — OFFICE VISIT (OUTPATIENT)
Dept: OBSTETRICS AND GYNECOLOGY | Facility: CLINIC | Age: 23
End: 2023-03-30
Payer: MEDICAID

## 2023-03-30 VITALS
SYSTOLIC BLOOD PRESSURE: 106 MMHG | WEIGHT: 123 LBS | DIASTOLIC BLOOD PRESSURE: 62 MMHG | BODY MASS INDEX: 22.63 KG/M2 | HEIGHT: 62 IN

## 2023-03-30 DIAGNOSIS — Z30.431 IUD CHECK UP: Primary | ICD-10-CM

## 2023-03-30 NOTE — PROGRESS NOTES
"String Check  Chief Complaint   Patient presents with   • Follow-up     IUD string check      Anita Lazcano is a 23 y.o.  who presented after placement of Mirena IUD on 22. The patient was unable to follow up earlier for her IUD string check. She reports that she had light bleeding for 3 days following insertion and 5-7 days of mild abdominal cramping. She is still having light, intermittent periods with IUD in place but states she is doing well.     OBJECTIVE:  /62   Ht 157.5 cm (62.01\")   Wt 55.8 kg (123 lb)   LMP 2023   BMI 22.49 kg/m²    Gen: NAD  Pelvic: IUD strings seen and appear appropriate in length    ASSESSMENT:   IUD in place    PLAN:   Discussed that her IUD appears in appropriate position on pelvic examination and recommend she check the strings monthly or for concerns.   She is aware that the Mirena IUD is effective contraception for 8 years per FDA but may be removed sooner as desired.   Follow up for annual exam or earlier as needed.     Tiffanie Boyd MD       "

## 2023-08-29 ENCOUNTER — OFFICE VISIT (OUTPATIENT)
Dept: OBSTETRICS AND GYNECOLOGY | Facility: CLINIC | Age: 23
End: 2023-08-29
Payer: MEDICAID

## 2023-08-29 VITALS
HEIGHT: 62 IN | SYSTOLIC BLOOD PRESSURE: 101 MMHG | BODY MASS INDEX: 22.45 KG/M2 | DIASTOLIC BLOOD PRESSURE: 64 MMHG | WEIGHT: 122 LBS

## 2023-08-29 DIAGNOSIS — N92.1 BREAKTHROUGH BLEEDING WITH IUD: ICD-10-CM

## 2023-08-29 DIAGNOSIS — N90.89 VULVAR LESION: Primary | ICD-10-CM

## 2023-08-29 DIAGNOSIS — Z97.5 BREAKTHROUGH BLEEDING WITH IUD: ICD-10-CM

## 2023-08-29 DIAGNOSIS — Z30.431 IUD CHECK UP: ICD-10-CM

## 2023-08-29 RX ORDER — HYDROXYZINE PAMOATE 25 MG/1
CAPSULE ORAL
COMMUNITY
Start: 2023-06-25

## 2023-08-29 RX ORDER — LAMOTRIGINE 100 MG/1
1 TABLET ORAL DAILY
COMMUNITY
Start: 2023-07-26

## 2023-08-29 NOTE — PROGRESS NOTES
Total Hip Arthroplasty discharge instructions     Anticoagulants:    Xarelto 10mg a day for 30 days.Then take 81 mg of aspirin twice a day for 2 weeks once Xarelto is complete.    Other Medications:  Iron supplement for 2 weeks  neurontin (gabapentin) until your current prescription is gone, 100 mg 3x per day. Unless you were taking Gabapentin prior to surgery prescribed by another provider.  Multivitamin    Pain:  Your post op discharge medication is __Tramadol____. Take this as prescribed. You may take Tylenol with this medication. You are not to exceed 4000 mg of Tylenol in a 24 hour period.     Activity:    Do not drive a motor vehicle, operate machinery or appliances, make important decisions, sign legal documents, or drink alcohol until you are re-evaluated by your surgeon at your first follow-up visit.    Continue these restrictions while you are taking pain medications (as it is illegal to drive while taking narcotics). Continue to refrain from driving until you have met therapy requirements with range of motion for driving a car, and you are felt to be stable enough to have quick enough reaction times.      Weight-bearing status:    Weight bear as tolerated with walker.     Physical Therapy: If you are having Home Health Care, nurse will evaluate you and then physical therapy will start; however, if you are having outpatient physical therapy, an appointment will be made for you.   You will do your home exercise program 5 times each day.     Diet: Resume your regular diet as soon as tolerated. Try to increase your fluids, fibers, fruits and vegetables to prevent constipation from pain medications.     Bandages and Wound Care:   You have been discharged with a Mepilex dressing. That dressing is waterproof. When you shower, we advised to keep it water tight by applying saran wrap or press and seal over the dressing to help prevent water getting under the bandage. You will keep the Mepilex dressing on until  "Chief Complaint   Patient presents with    Follow-up     Discuss B/C  bleeding on Mirena. Vaginal bump outside        SUBJECTIVE:     Anita Lazcano is a 23 y.o.  who presents with breakthrough bleeding with Mirena IUD and vagina bump. She had the Mirena IUD inserted on 22. She reports that her periods were regular at first then light bleeding every 14-20 days and now there is episodes of light and heavy vaginal bleeding. She reports that usually her periods are monthly now and she has heavy bleeding for the first couple of days then lighter bleeding for several days. She does not want to have the device removed.     She also is concerned regarding vaginal bump on the outside that is tender and present for the last 4 days. It has not drained or grown in size. She denies burning sensation in the area.     Past Medical History:   Diagnosis Date    Chlamydia 2019    Gonorrhea 2019    Urogenital trichomoniasis 2019      History reviewed. No pertinent surgical history.   Social History     Tobacco Use    Smoking status: Former    Smokeless tobacco: Current   Vaping Use    Vaping Use: Every day    Start date: 2021    Substances: Nicotine   Substance Use Topics    Alcohol use: Not Currently    Drug use: Not Currently     OB History    Para Term  AB Living   1 1 1     1   SAB IAB Ectopic Molar Multiple Live Births           0 1      # Outcome Date GA Lbr Gilberto/2nd Weight Sex Delivery Anes PTL Lv   1 Term 22 39w2d 12:30 / 00:28 3644 g (8 lb 0.5 oz) F Vag-Spont EPI N MYCHAL      Birth Comments: scale #4        Review of Systems   Genitourinary:  Positive for genital sores and menstrual problem.     OBJECTIVE:   Vitals:    23 1546   BP: 101/64   Weight: 55.3 kg (122 lb)   Height: 157.5 cm (62.01\")        Physical Exam  Vitals reviewed. Exam conducted with a chaperone present.   Constitutional:       General: She is not in acute distress.  HENT:      Head: Normocephalic and atraumatic.      " Right Ear: External ear normal.      Left Ear: External ear normal.   Pulmonary:      Effort: Pulmonary effort is normal. No respiratory distress.   Abdominal:      General: There is no distension.      Palpations: Abdomen is soft.      Tenderness: There is no abdominal tenderness. There is no guarding or rebound.   Genitourinary:     General: Normal vulva.      Exam position: Lithotomy position.      Labia:         Right: No rash, tenderness, lesion or injury.         Left: Tenderness and lesion present. No rash or injury.       Urethra: No prolapse or urethral swelling.      Vagina: No vaginal discharge, erythema, tenderness, bleeding or lesions.      Cervix: Normal.      Uterus: Not enlarged, not fixed and not tender.       Adnexa:         Right: No mass, tenderness or fullness.          Left: No mass, tenderness or fullness.            Comments: IUD strings visualized at appropriate length from external cervical os.   Musculoskeletal:         General: No deformity. Normal range of motion.      Cervical back: Normal range of motion and neck supple.   Lymphadenopathy:      Lower Body: No right inguinal adenopathy. No left inguinal adenopathy.   Skin:     General: Skin is warm and dry.   Neurological:      General: No focal deficit present.      Mental Status: She is alert and oriented to person, place, and time.   Psychiatric:         Mood and Affect: Mood normal.         Behavior: Behavior normal.       ASSESSMENT:     ICD-10-CM ICD-9-CM   1. Vulvar lesion  N90.89 624.8   2. IUD check up  Z30.431 V25.42   3. Breakthrough bleeding with IUD  N92.1 626.6    Z97.5 V45.51       PLAN:   - Performed pelvic exam and collected HSV culture of the ulcerated vulvar lesion. I discussed with the patient that it does not have a typically look for genital herpes and it is more likely resolving folliculitis. There is no evidence of any other lesion on exam. Will also obtain HSV-2 antibody to rule out HSV since swab may return  you follow up with us in the office at your first post-op visit.    No baths, hot tubs, or swimming until advised by your doctor (usually 2 - 3 weeks).     Call the office ([phone]) if there are any signs of infection:    Increased warmth; Pain; Swelling; Redness; Drainage; Odor; Separation of incision.      Constipation:    Prevent constipation by taking your stool softeners as suggested. Increase fluid intake and fiber, like prunes, prune juice, apricots and raisins.    If you have difficulty with constipation, ask your pharmacist for an over-the-counter suppository or enema.    If having constipation with stomach pain and nausea and / or vomiting, please contact our office at [phone].     Special Instructions:    Use ice packs or cold therapy unit at all times for the first 72 hours. Then use as needed to prevent pain and swelling. Do NOT apply ice directly to the skin.    Wear compression stockings until you have returned to a normal level of activity - approximately 4 - 6 weeks after surgery.     Call Your Doctor If:    You have severe pain not controlled by pain medications.    You notice increased bleeding, heat, drainage, swelling or redness from your incisions.    Temperature greater than 101ºF.    If you are unable to empty your bladder in 8 - 12 hours after your surgery.    If you have any questions or concerns, call your physician.    Follow-up Appointment: 7/06/21 @ [address] [place] [address]   6/23/21 @ [address] [place] [address] for drain and dressing pull IF you go home same day.  Physician: MD Shane Oliva PA-C    Office Address: [facility]   [address] [place] [address]   Office Phone: [phone]     Discharge Instructions: Caring for Your Hemovac Drainage Tube    You have been discharged with a Hemovac drainage tube. The tube was placed in your incision to remove fluid and is attached to a drain or collection device. It  negative if lesion is resolving. If both return negative, I would say that it is folliculitis. If positive, will provide treatment with antiviral medication. She indicated understanding of plan of care.  - IUD strings confirmed in appropriate position on exam and a transvaginal ultrasound noted the IUD to be in appropriate position. I discussed that breakthrough vaginal bleeding with an IUD is common and not everyone will become amenorrheic with the device. The patient states it is manageable and would like to continue using the device at this time. All questions and concerns answered.  - Follow up as needed or for annual exam.      See below for orders    Orders Placed This Encounter   Procedures    Herpes Simplex Virus Culture - Swab, Vulva     Order Specific Question:   Release to patient     Answer:   Routine Release [[id number]]    US Non-ob Transvaginal     Order Specific Question:   Reason for Exam:     Answer:   IUD check up     Order Specific Question:   Release to patient     Answer:   Routine Release [[id number]]    HSV 2 Antibody, IgG     Order Specific Question:   Release to patient     Answer:   Routine Release [[id number]]      Tiffanie Boyd MD       will help healing and reduce the risk of infection. Expect to see fluid and blood in the drain. You may also feel some burning and pulling from the stitch that holds the tube in place. Your drain will be removed when the physician feels its ready to be removed. There is a bandage at the site where the tube is placed. This is to protect the open area from infection.    Here's what you need to do to care for your Hemovac drainage tube.    General guidelines  · Don’t sleep on the same side as the tube.  · Secure the tube and bag inside your clothing. This will prevent the tube from being pulled out.  · Take a sponge bath to avoid getting your bandage and tube site wet, unless your healthcare provider tells you otherwise.  · Ask your provider when can you take a shower or bathe.  · Ask your provider about the best way to keep the site dry when bathing or showering.    Empty the drain  Empty your drain at least twice a day. Empty it more often if needed.  · Lift the stopper. The drain will expand.  · Turn the drain upside down.  · Drain the fluid into a measuring cup.  · Record the amount of fluid each time you empty the drain. Share this information with your doctor on your next visit.  · Place the empty drain on a hard surface and press down until it is flat.  · Close the cork device.    Date Date Date Date Date Date Date Date   Amount CCs          Amount CCs          Amount CCs          Amount CCs          Amount CCs          Amount CCs          Amount CCs          TOTAL               When to call your doctor  Call your doctor right away if you have any of the following:  · Pain, swelling, or fluid around the tube  · Redness or warmth around the incision or fluid draining from the incision  · Nausea and vomiting  · Fever above 100.4 °F (38 °C) or chills  · An incision that does not heal; stitches that become infected or loose  · A tube that falls out  · A foul smell from the incision site  · An increase in the amount  of drainage after an initial decrease       © 3967-3581 [employer]. [address] [place] [address], [place] [address]. All rights reserved. This information is not intended as a substitute for professional medical care. Always follow your healthcare professional's instructions.    Home Instruction Sheet  ANESTHESIA for ADULT       What are the side effects?  Side effects depend on the medication used, and may not even be present.    Most Common Sometimes   Irritability  Poor Balance  Sleeping for Several Hours  Drowsiness  Fatigue  Difficulty Concentrating Change in Behavior  Hyperactivity  Nausea (upset stomach)  Gas (flatulence)  Dizziness  Hiccups  Constipation  Blurred Vision     1. The effects of sedation medicine can last up to 24 hours.  You may be drowsy or irritable for 2 to 8 hours after receiving medicine.  2. You may need to sleep after leaving the testing area.  Sleeping after sedation will help reduce irritability.  3. Diet:  - Do not give anything to eat or drink until you are fully awake.  Eat a light meal and advance to a normal diet unless instructed differently:   - Do not drink alcohol beverages for the next 24 hours.  - Avoid greasy or spicy foods today.  4. Activity:  - You may be dizzy and/or unsteady.  Ask for help walking, using the bathroom, or stairs to protect yourself from injury.  - You should not drive a vehicle, operate machinery or power tools for 24 hours because your reflexes may be slow and your vision may be blurry.  - Do not sign any legally binding documents or make important decisions for 24 hours after receiving sedation.  5. Medications:   Unless told otherwise, do not take any non-prescription medications (cold medicine, etc.) for 24 hours, as these medications in combination with sedation medication, can cause increased drowsiness.  If you feel that you need over the counter medication, discuss with your physician.    When Should I Call the Doctor?  - Vomiting more  than twice.  - Extreme irritability or unusual changes in behavior.  - Trouble arousing.  - Inability to urinate.  - Trouble breathing - call 911.    We would like to take this opportunity to thank you. Because your confidence in your caregivers is very important to us, it is our commitment to always treat you and your family with courtesy and respect. Our goal is to always answer any questions or worries or concerns you may have about the care you received.  If you have any suggestions for improvement or worries or concerns please do not hesitate to let us know.

## 2023-08-31 ENCOUNTER — TELEPHONE (OUTPATIENT)
Dept: OBSTETRICS AND GYNECOLOGY | Facility: CLINIC | Age: 23
End: 2023-08-31
Payer: MEDICAID

## 2023-08-31 LAB
HSV SPEC CULT: NEGATIVE
HSV2 IGG SER IA-ACNC: <0.91 INDEX (ref 0–0.9)

## 2023-08-31 NOTE — TELEPHONE ENCOUNTER
Called patient and reviewed negative results for herpes culture and HSV-2 antibodies. Patient is excited and indicated understanding. All questions answered.    Tiffanie Boyd MD

## 2023-09-23 NOTE — PROGRESS NOTES
Chief Complaint   Patient presents with   • Routine Prenatal Visit      Anita Lazcano is a 22 y.o.  at 24w5d who presents for routine prenatal care. She reports doing well and denies concerns today. Denies vaginal bleeding, cramping, contractions, LOF. She reports active fetal movement. She is still smoking a small amount and using the nicoderm patch when not smoking. She is interested in Nexplanon for birth control after delivery. She would like a note for her dentist today so she can have additional dental work performed.     /73   Wt 66.7 kg (147 lb)   LMP 2021   BMI 26.04 kg/m²    Gen: well appearing, NAD   Abd: gravid, nontender  See OB Flowsheet    ASSESSMENT:   1. IUP at 24w5d   2. Supervision of normal first pregnancy  3. Maternal anemia   4. History of GBS UTI in first trimester   5. Tobacco use in pregnancy   6. Prenatal care, antepartum  7. Contraceptive counseling      PLAN:  Problem list reviewed and updated.   Reviewed expectations of this stage of pregnancy.   Second trimester precautions reviewed including  labor precautions, anticipated fetal movements.   Will obtain CBC and 1h GTT at next visit.   Will offer Tdap at next visit.  Collected urine culture for test of cure given history of GBS UTI in first trimester and no test of cure previously performed.  Encouraged smoking cessation.  We discussed contraceptive options following delivery including but not limited to pills, patches, vaginal rings, injections, implants and IUDs. The patient is interested in Nexplanon and I discussed that it is the most effective birth control but common side effects that occur can be irregular bleeding patterns and weight gain. The patient is going to consider her options.   Return in about 4 weeks (around 3/24/2022) for prenatal visit and 1 GTT .    Patient Active Problem List    Diagnosis Date Noted   • Pregnancy 10/21/2021       Orders Placed This Encounter   Procedures   • Urine Culture  - Urine, Urine, Clean Catch     Order Specific Question:   Release to patient     Answer:   Immediate   • CBC (No Diff)     Standing Status:   Future     Standing Expiration Date:   2/24/2023     Order Specific Question:   Release to patient     Answer:   Immediate   • Gestational Screen 1 Hr ([facility])     Standing Status:   Future     Standing Expiration Date:   2/24/2023     Order Specific Question:   Release to patient     Answer:   Immediate   • POC Urinalysis Dipstick     This is an external result entered through the Results Console.     Order Specific Question:   Release to patient     Answer:   Immediate     Tiffanie Boyd MD    hand grasp, leg strength strong and equal bilaterally

## 2024-03-25 ENCOUNTER — OFFICE VISIT (OUTPATIENT)
Dept: OBSTETRICS AND GYNECOLOGY | Facility: CLINIC | Age: 24
End: 2024-03-25
Payer: MEDICAID

## 2024-03-25 VITALS
HEIGHT: 62 IN | DIASTOLIC BLOOD PRESSURE: 69 MMHG | WEIGHT: 124.6 LBS | BODY MASS INDEX: 22.93 KG/M2 | SYSTOLIC BLOOD PRESSURE: 104 MMHG

## 2024-03-25 DIAGNOSIS — Z30.013 ENCOUNTER FOR PRESCRIPTION FOR DEPO-PROVERA: ICD-10-CM

## 2024-03-25 DIAGNOSIS — R82.90 FOUL SMELLING URINE: ICD-10-CM

## 2024-03-25 DIAGNOSIS — Z30.432 ENCOUNTER FOR IUD REMOVAL: Primary | ICD-10-CM

## 2024-03-25 DIAGNOSIS — B37.9 YEAST INFECTION: ICD-10-CM

## 2024-03-25 RX ORDER — MEDROXYPROGESTERONE ACETATE 104 MG/.65ML
0.65 INJECTION, SUSPENSION SUBCUTANEOUS
Qty: 0.65 ML | Refills: 3 | Status: SHIPPED | OUTPATIENT
Start: 2024-03-25

## 2024-03-25 NOTE — PROGRESS NOTES
Mirena IUD Removal Procedure Note    Indications: Patient desires Mirena IUD removal due to pelvic pain and irregular bleeding.     Pre Procedural Diagnosis: Encounter for Mirena intrauterine device removal     Post Procedural Diagnosis: Same    Counseling:  Patient was counseled on risks of IUD removal including but not limited to abnormal bleeding, infection, pregnancy, need for additional procedures and/or need for surgery.  All questions were answered to apparent satisfaction.     Procedure Details     Bimanual exam revealed Anteverted.  A speculum was inserted and a thick white discharge visualized in the vagina. The IUD strings were seen, grasped with a ring forcep and removed without difficulty.  The Mirena IUD was inspected and noted to be removed in its entirety.  Patient tolerated procedure well.    IUD Information:  See medication record.    Condition:  Stable    Complications:  None    Plan:    The patient was advised to call for any fever or for prolonged or severe pain or bleeding. She was advised to use OTC analgesics as needed for mild to moderate pain. We discussed other contraceptive options including but not limited to pills, injections, implant, vaginal rings, patches. She would like to use depo provera injections and discussed efficacy, how it works, and how it is administered. Patient desires to use subcutaneous depo provera and prescription sent to her pharmacy. She is to contact the office for any side effects or concerns.   Collected NuSwab BV/Candida for suspected yeast infection and urine culture sent to evaluate foul smelling urine.   Return to the clinic PRN for follow-up.    Tiffanie Boyd MD

## 2024-03-28 LAB
A VAGINAE DNA VAG QL NAA+PROBE: NORMAL SCORE
BVAB2 DNA VAG QL NAA+PROBE: NORMAL SCORE
C ALBICANS DNA VAG QL NAA+PROBE: NEGATIVE
C GLABRATA DNA VAG QL NAA+PROBE: NEGATIVE
C TRACH DNA VAG QL NAA+PROBE: NEGATIVE
MEGA1 DNA VAG QL NAA+PROBE: NORMAL SCORE
N GONORRHOEA DNA VAG QL NAA+PROBE: NEGATIVE
T VAGINALIS DNA VAG QL NAA+PROBE: NEGATIVE

## 2024-03-29 LAB
BACTERIA UR CULT: ABNORMAL
BACTERIA UR CULT: ABNORMAL
OTHER ANTIBIOTIC SUSC ISLT: ABNORMAL

## 2024-04-01 DIAGNOSIS — B95.2 ENTEROCOCCUS UTI: Primary | ICD-10-CM

## 2024-04-01 DIAGNOSIS — N39.0 ENTEROCOCCUS UTI: Primary | ICD-10-CM

## 2024-04-01 RX ORDER — FLUCONAZOLE 150 MG/1
150 TABLET ORAL
Qty: 2 TABLET | Refills: 0 | Status: SHIPPED | OUTPATIENT
Start: 2024-04-01

## 2024-04-01 RX ORDER — NITROFURANTOIN 25; 75 MG/1; MG/1
100 CAPSULE ORAL 2 TIMES DAILY
Qty: 14 CAPSULE | Refills: 0 | Status: SHIPPED | OUTPATIENT
Start: 2024-04-01 | End: 2024-04-08

## 2024-04-01 NOTE — PROGRESS NOTES
Pt aware of results, please advise : Pt wanted to know if she could get something for yeast infection , prefers pills.

## 2025-01-30 ENCOUNTER — HOSPITAL ENCOUNTER (EMERGENCY)
Facility: HOSPITAL | Age: 25
Discharge: HOME | End: 2025-01-30
Attending: STUDENT IN AN ORGANIZED HEALTH CARE EDUCATION/TRAINING PROGRAM
Payer: MEDICAID

## 2025-01-30 VITALS
HEART RATE: 122 BPM | TEMPERATURE: 98.6 F | RESPIRATION RATE: 16 BRPM | DIASTOLIC BLOOD PRESSURE: 72 MMHG | OXYGEN SATURATION: 99 % | SYSTOLIC BLOOD PRESSURE: 100 MMHG

## 2025-01-30 DIAGNOSIS — D64.9 ANEMIA, UNSPECIFIED TYPE: Primary | ICD-10-CM

## 2025-01-30 DIAGNOSIS — R19.4 BOWEL HABIT CHANGES: ICD-10-CM

## 2025-01-30 LAB
ALBUMIN SERPL-MCNC: 4.4 G/DL (ref 3.5–5.2)
ALBUMIN/GLOB SERPL: 1.6 G/DL
ALP SERPL-CCNC: 50 U/L (ref 39–117)
ALT SERPL W P-5'-P-CCNC: 13 U/L (ref 1–33)
ANION GAP SERPL CALCULATED.3IONS-SCNC: 11.5 MMOL/L (ref 5–15)
AST SERPL-CCNC: 16 U/L (ref 1–32)
BASOPHILS # BLD AUTO: 0.05 10*3/MM3 (ref 0–0.2)
BASOPHILS NFR BLD AUTO: 0.5 % (ref 0–1.5)
BILIRUB SERPL-MCNC: 0.5 MG/DL (ref 0–1.2)
BUN SERPL-MCNC: 11 MG/DL (ref 6–20)
BUN/CREAT SERPL: 16.7 (ref 7–25)
CALCIUM SPEC-SCNC: 9.3 MG/DL (ref 8.6–10.5)
CHLORIDE SERPL-SCNC: 103 MMOL/L (ref 98–107)
CO2 SERPL-SCNC: 24.5 MMOL/L (ref 22–29)
CREAT SERPL-MCNC: 0.66 MG/DL (ref 0.57–1)
DEPRECATED RDW RBC AUTO: 42.1 FL (ref 37–54)
EGFRCR SERPLBLD CKD-EPI 2021: 126.6 ML/MIN/1.73
EOSINOPHIL # BLD AUTO: 0.06 10*3/MM3 (ref 0–0.4)
EOSINOPHIL NFR BLD AUTO: 0.6 % (ref 0.3–6.2)
ERYTHROCYTE [DISTWIDTH] IN BLOOD BY AUTOMATED COUNT: 13.3 % (ref 12.3–15.4)
GLOBULIN UR ELPH-MCNC: 2.8 GM/DL
GLUCOSE SERPL-MCNC: 106 MG/DL (ref 65–99)
HCG SERPL QL: NEGATIVE
HCT VFR BLD AUTO: 34.4 % (ref 34–46.6)
HGB BLD-MCNC: 10.7 G/DL (ref 12–15.9)
HOLD SPECIMEN: NORMAL
IMM GRANULOCYTES # BLD AUTO: 0.03 10*3/MM3 (ref 0–0.05)
IMM GRANULOCYTES NFR BLD AUTO: 0.3 % (ref 0–0.5)
LIPASE SERPL-CCNC: 14 U/L (ref 13–60)
LYMPHOCYTES # BLD AUTO: 2.66 10*3/MM3 (ref 0.7–3.1)
LYMPHOCYTES NFR BLD AUTO: 27 % (ref 19.6–45.3)
MCH RBC QN AUTO: 26.5 PG (ref 26.6–33)
MCHC RBC AUTO-ENTMCNC: 31.1 G/DL (ref 31.5–35.7)
MCV RBC AUTO: 85.1 FL (ref 79–97)
MONOCYTES # BLD AUTO: 0.99 10*3/MM3 (ref 0.1–0.9)
MONOCYTES NFR BLD AUTO: 10 % (ref 5–12)
NEUTROPHILS NFR BLD AUTO: 6.07 10*3/MM3 (ref 1.7–7)
NEUTROPHILS NFR BLD AUTO: 61.6 % (ref 42.7–76)
NRBC BLD AUTO-RTO: 0 /100 WBC (ref 0–0.2)
PLATELET # BLD AUTO: 297 10*3/MM3 (ref 140–450)
PMV BLD AUTO: 9.6 FL (ref 6–12)
POTASSIUM SERPL-SCNC: 3.3 MMOL/L (ref 3.5–5.2)
PROT SERPL-MCNC: 7.2 G/DL (ref 6–8.5)
RBC # BLD AUTO: 4.04 10*6/MM3 (ref 3.77–5.28)
SODIUM SERPL-SCNC: 139 MMOL/L (ref 136–145)
WBC NRBC COR # BLD AUTO: 9.86 10*3/MM3 (ref 3.4–10.8)
WHOLE BLOOD HOLD COAG: NORMAL
WHOLE BLOOD HOLD SPECIMEN: NORMAL

## 2025-01-30 PROCEDURE — 99283 EMERGENCY DEPT VISIT LOW MDM: CPT

## 2025-01-30 PROCEDURE — 80053 COMPREHEN METABOLIC PANEL: CPT

## 2025-01-30 PROCEDURE — 83690 ASSAY OF LIPASE: CPT

## 2025-01-30 PROCEDURE — 36415 COLL VENOUS BLD VENIPUNCTURE: CPT

## 2025-01-30 PROCEDURE — 85025 COMPLETE CBC W/AUTO DIFF WBC: CPT

## 2025-01-30 PROCEDURE — 25810000003 SODIUM CHLORIDE 0.9 % SOLUTION: Performed by: NURSE PRACTITIONER

## 2025-01-30 PROCEDURE — 84703 CHORIONIC GONADOTROPIN ASSAY: CPT

## 2025-01-30 RX ORDER — SODIUM CHLORIDE 0.9 % (FLUSH) 0.9 %
10 SYRINGE (ML) INJECTION AS NEEDED
Status: DISCONTINUED | OUTPATIENT
Start: 2025-01-30 | End: 2025-01-30 | Stop reason: HOSPADM

## 2025-01-30 RX ADMIN — SODIUM CHLORIDE 1000 ML: 9 INJECTION, SOLUTION INTRAVENOUS at 13:56
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
## 2025-01-30 NOTE — ED NOTES
Pt think she has parasites or worms from eating undercooked venison.     Educated pt on referral to GI and NP explained the GI panel we run in the ER might not test for parasites. A referral to GI would probably be best for pt.   Pt is hoping a specialist will come see her in the ER.

## 2025-01-30 NOTE — ED NOTES
"Patient to ER via car from home for \"I think I have worms\"  X months trouble sleeping muscle tension can't sleep IBS s/s white spots in poop   "

## 2025-01-30 NOTE — ED PROVIDER NOTES
EMERGENCY DEPARTMENT MD ATTESTATION NOTE    Room Number:  04/04  PCP: Provider, No Known  Independent Historians: Patient    HPI:    Context: Anita Lazcano is a 25 y.o. female  who presents to the ED c/o acute changes to bowel movements.  Patient is concern for parasite infection.  Patient is lost 20 pounds.    PHYSICAL EXAM    I have reviewed the triage vital signs and nursing notes.    ED Triage Vitals   Temp Heart Rate Resp BP SpO2   01/30/25 1253 01/30/25 1253 01/30/25 1253 01/30/25 1258 01/30/25 1253   98.6 °F (37 °C) (!) 122 16 130/59 99 %      Temp src Heart Rate Source Patient Position BP Location FiO2 (%)   -- -- 01/30/25 1258 -- --     Sitting         Physical Exam  GENERAL: alert, no acute distress  SKIN: Warm, dry  HENT: Normocephalic, atraumatic  EYES: no scleral icterus  CV: regular rhythm, regular rate  RESPIRATORY: normal effort, lungs clear  ABDOMEN: soft, nontender, nondistended  MUSCULOSKELETAL: no deformity  NEURO: alert, moves all extremities, follows commands            MEDICATIONS GIVEN IN ER  Medications   sodium chloride 0.9 % flush 10 mL (has no administration in time range)   sodium chloride 0.9 % flush 10 mL (has no administration in time range)   sodium chloride 0.9 % bolus 1,000 mL (1,000 mL Intravenous New Bag 1/30/25 1356)         ORDERS PLACED DURING THIS VISIT:  Orders Placed This Encounter   Procedures    Gastrointestinal Panel, PCR - Stool, Per Rectum    [place] Draw    Comprehensive Metabolic Panel    Lipase    Urinalysis With Microscopic If Indicated (No Culture) - Urine, Clean Catch    hCG, Serum, Qualitative    CBC Auto Differential    NPO Diet NPO Type: Strict NPO    Undress & Gown    Insert Peripheral IV    Insert Peripheral IV    CBC & Differential    Green Top (Gel)    Lavender Top    Light Blue Top         PROCEDURES  Procedures            PROGRESS, DATA ANALYSIS, CONSULTS, AND MEDICAL DECISION MAKING  All labs have been independently interpreted by me.  All radiology  studies have been reviewed by me. All EKG's have been independently viewed and interpreted by me.  Discussion below represents my analysis of pertinent findings related to patient's condition, differential diagnosis, treatment plan and final disposition.    Differential diagnosis includes but is not limited to anxiety, delusions, parasite infection.    Clinical Scores:                                     ED Course as of 01/30/25 1758   Thu Jan 30, 2025   1509 Pt tolerating PO, agreeable to dc home and pcp f/u []      ED Course User Index  [AH] Ayanna Ashford APRN         COMPLEXITY OF CARE  Admission was considered but after careful review of the patient's presentation, physical examination, diagnostic results, and response to treatment the patient may be safely discharged with outpatient follow-up.    Please note that portions of this document were completed with a voice recognition program.    Note Disclaimer: At [facility], we believe that sharing information builds trust and better relationships. You are receiving this note because you recently visited [facility]. It is possible you will see health information before a provider has talked with you about it. This kind of information can be easy to misunderstand. To help you fully understand what it means for your health, we urge you to discuss this note with your provider.         Osmin Macario MD  01/30/25 1759

## 2025-04-05 ENCOUNTER — APPOINTMENT (OUTPATIENT)
Dept: CT IMAGING | Facility: HOSPITAL | Age: 25
End: 2025-04-05
Payer: MEDICAID

## 2025-04-05 ENCOUNTER — HOSPITAL ENCOUNTER (EMERGENCY)
Facility: HOSPITAL | Age: 25
Discharge: HOME | End: 2025-04-05
Attending: EMERGENCY MEDICINE
Payer: MEDICAID

## 2025-04-05 VITALS
HEART RATE: 86 BPM | OXYGEN SATURATION: 100 % | TEMPERATURE: 97 F | RESPIRATION RATE: 16 BRPM | SYSTOLIC BLOOD PRESSURE: 113 MMHG | DIASTOLIC BLOOD PRESSURE: 79 MMHG

## 2025-04-05 DIAGNOSIS — B82.9 INTESTINAL PARASITISM: Primary | ICD-10-CM

## 2025-04-05 LAB
ADV 40+41 DNA STL QL NAA+NON-PROBE: NOT DETECTED
ALBUMIN SERPL-MCNC: 4.4 G/DL (ref 3.5–5.2)
ALBUMIN/GLOB SERPL: 1.9 G/DL
ALP SERPL-CCNC: 52 U/L (ref 39–117)
ALT SERPL W P-5'-P-CCNC: 12 U/L (ref 1–33)
ANION GAP SERPL CALCULATED.3IONS-SCNC: 8.4 MMOL/L (ref 5–15)
AST SERPL-CCNC: 15 U/L (ref 1–32)
ASTRO TYP 1-8 RNA STL QL NAA+NON-PROBE: NOT DETECTED
BASOPHILS # BLD AUTO: 0.06 10*3/MM3 (ref 0–0.2)
BASOPHILS NFR BLD AUTO: 0.8 % (ref 0–1.5)
BILIRUB SERPL-MCNC: <0.2 MG/DL (ref 0–1.2)
BILIRUB UR QL STRIP: NEGATIVE
BUN SERPL-MCNC: 13 MG/DL (ref 6–20)
BUN/CREAT SERPL: 18.3 (ref 7–25)
C CAYETANENSIS DNA STL QL NAA+NON-PROBE: NOT DETECTED
C COLI+JEJ+UPSA DNA STL QL NAA+NON-PROBE: NOT DETECTED
CALCIUM SPEC-SCNC: 8.8 MG/DL (ref 8.6–10.5)
CHLORIDE SERPL-SCNC: 102 MMOL/L (ref 98–107)
CLARITY UR: CLEAR
CO2 SERPL-SCNC: 25.6 MMOL/L (ref 22–29)
COLOR UR: YELLOW
CREAT SERPL-MCNC: 0.71 MG/DL (ref 0.57–1)
CRYPTOSP DNA STL QL NAA+NON-PROBE: NOT DETECTED
DEPRECATED RDW RBC AUTO: 37.3 FL (ref 37–54)
E HISTOLYT DNA STL QL NAA+NON-PROBE: NOT DETECTED
EAEC PAA PLAS AGGR+AATA ST NAA+NON-PRB: NOT DETECTED
EC STX1+STX2 GENES STL QL NAA+NON-PROBE: NOT DETECTED
EGFRCR SERPLBLD CKD-EPI 2021: 122.7 ML/MIN/1.73
EOSINOPHIL # BLD AUTO: 0.12 10*3/MM3 (ref 0–0.4)
EOSINOPHIL NFR BLD AUTO: 1.6 % (ref 0.3–6.2)
EPEC EAE GENE STL QL NAA+NON-PROBE: NOT DETECTED
ERYTHROCYTE [DISTWIDTH] IN BLOOD BY AUTOMATED COUNT: 12.3 % (ref 12.3–15.4)
ETEC LTA+ST1A+ST1B TOX ST NAA+NON-PROBE: NOT DETECTED
G LAMBLIA DNA STL QL NAA+NON-PROBE: NOT DETECTED
GLOBULIN UR ELPH-MCNC: 2.3 GM/DL
GLUCOSE SERPL-MCNC: 109 MG/DL (ref 65–99)
GLUCOSE UR STRIP-MCNC: NEGATIVE MG/DL
HCG SERPL QL: NEGATIVE
HCT VFR BLD AUTO: 35.3 % (ref 34–46.6)
HGB BLD-MCNC: 11.3 G/DL (ref 12–15.9)
HGB UR QL STRIP.AUTO: NEGATIVE
IMM GRANULOCYTES # BLD AUTO: 0.02 10*3/MM3 (ref 0–0.05)
IMM GRANULOCYTES NFR BLD AUTO: 0.3 % (ref 0–0.5)
KETONES UR QL STRIP: NEGATIVE
LEUKOCYTE ESTERASE UR QL STRIP.AUTO: NEGATIVE
LIPASE SERPL-CCNC: 41 U/L (ref 13–60)
LYMPHOCYTES # BLD AUTO: 2 10*3/MM3 (ref 0.7–3.1)
LYMPHOCYTES NFR BLD AUTO: 26.5 % (ref 19.6–45.3)
MCH RBC QN AUTO: 27.1 PG (ref 26.6–33)
MCHC RBC AUTO-ENTMCNC: 32 G/DL (ref 31.5–35.7)
MCV RBC AUTO: 84.7 FL (ref 79–97)
MONOCYTES # BLD AUTO: 0.61 10*3/MM3 (ref 0.1–0.9)
MONOCYTES NFR BLD AUTO: 8.1 % (ref 5–12)
NEUTROPHILS NFR BLD AUTO: 4.75 10*3/MM3 (ref 1.7–7)
NEUTROPHILS NFR BLD AUTO: 62.7 % (ref 42.7–76)
NITRITE UR QL STRIP: NEGATIVE
NOROVIRUS GI+II RNA STL QL NAA+NON-PROBE: NOT DETECTED
NRBC BLD AUTO-RTO: 0 /100 WBC (ref 0–0.2)
P SHIGELLOIDES DNA STL QL NAA+NON-PROBE: NOT DETECTED
PH UR STRIP.AUTO: 7.5 [PH] (ref 5–8)
PLATELET # BLD AUTO: 335 10*3/MM3 (ref 140–450)
PMV BLD AUTO: 10.1 FL (ref 6–12)
POTASSIUM SERPL-SCNC: 3.7 MMOL/L (ref 3.5–5.2)
PROT SERPL-MCNC: 6.7 G/DL (ref 6–8.5)
PROT UR QL STRIP: NEGATIVE
RBC # BLD AUTO: 4.17 10*6/MM3 (ref 3.77–5.28)
RVA RNA STL QL NAA+NON-PROBE: NOT DETECTED
S ENT+BONG DNA STL QL NAA+NON-PROBE: NOT DETECTED
SAPO I+II+IV+V RNA STL QL NAA+NON-PROBE: NOT DETECTED
SHIGELLA SP+EIEC IPAH ST NAA+NON-PROBE: NOT DETECTED
SODIUM SERPL-SCNC: 136 MMOL/L (ref 136–145)
SP GR UR STRIP: 1.01 (ref 1–1.03)
UROBILINOGEN UR QL STRIP: NORMAL
V CHOL+PARA+VUL DNA STL QL NAA+NON-PROBE: NOT DETECTED
V CHOLERAE DNA STL QL NAA+NON-PROBE: NOT DETECTED
WBC NRBC COR # BLD AUTO: 7.56 10*3/MM3 (ref 3.4–10.8)
Y ENTEROCOL DNA STL QL NAA+NON-PROBE: NOT DETECTED

## 2025-04-05 PROCEDURE — 25810000003 SODIUM CHLORIDE 0.9 % SOLUTION: Performed by: EMERGENCY MEDICINE

## 2025-04-05 PROCEDURE — 83690 ASSAY OF LIPASE: CPT | Performed by: EMERGENCY MEDICINE

## 2025-04-05 PROCEDURE — 80053 COMPREHEN METABOLIC PANEL: CPT | Performed by: EMERGENCY MEDICINE

## 2025-04-05 PROCEDURE — 96360 HYDRATION IV INFUSION INIT: CPT

## 2025-04-05 PROCEDURE — 81003 URINALYSIS AUTO W/O SCOPE: CPT | Performed by: EMERGENCY MEDICINE

## 2025-04-05 PROCEDURE — 85025 COMPLETE CBC W/AUTO DIFF WBC: CPT | Performed by: EMERGENCY MEDICINE

## 2025-04-05 PROCEDURE — 74177 CT ABD & PELVIS W/CONTRAST: CPT

## 2025-04-05 PROCEDURE — 87507 IADNA-DNA/RNA PROBE TQ 12-25: CPT | Performed by: EMERGENCY MEDICINE

## 2025-04-05 PROCEDURE — 99285 EMERGENCY DEPT VISIT HI MDM: CPT

## 2025-04-05 PROCEDURE — 70450 CT HEAD/BRAIN W/O DYE: CPT

## 2025-04-05 PROCEDURE — 84703 CHORIONIC GONADOTROPIN ASSAY: CPT | Performed by: EMERGENCY MEDICINE

## 2025-04-05 PROCEDURE — 25510000001 IOPAMIDOL 61 % SOLUTION: Performed by: EMERGENCY MEDICINE

## 2025-04-05 RX ORDER — ALBENDAZOLE 200 MG/1
400 TABLET, FILM COATED ORAL DAILY
Qty: 8 TABLET | Refills: 0 | Status: SHIPPED | OUTPATIENT
Start: 2025-04-05 | End: 2025-04-09

## 2025-04-05 RX ORDER — IOPAMIDOL 612 MG/ML
100 INJECTION, SOLUTION INTRAVASCULAR
Status: COMPLETED | OUTPATIENT
Start: 2025-04-05 | End: 2025-04-05

## 2025-04-05 RX ADMIN — IOPAMIDOL 85 ML: 612 INJECTION, SOLUTION INTRAVENOUS at 19:11

## 2025-04-05 RX ADMIN — SODIUM CHLORIDE 1000 ML: 0.9 INJECTION, SOLUTION INTRAVENOUS at 18:21

## 2025-04-05 NOTE — ED PROVIDER NOTES
EMERGENCY DEPARTMENT ENCOUNTER  Room Number:  36/36  PCP: System, Provider Not In  Independent Historians: Patient  Date of encounter:  4/5/2025  Patient Care Team:  System, Provider Not In as PCP - General     HPI:  Chief Complaint: had concerns including Abdominal Pain and Parasitic infection .     A complete HPI/ROS/PMH/PSH/SH/FH are unobtainable due to: None    Chronic or social conditions impacting patient care (Social Determinants of Health): None    Context: Anita Lazcano is a 25 y.o. female who presents to the ED c/o acute abdominal issues.  Patient has had up to 4 months of intermittent abdominal cramping, diarrhea and constipation.  She is concerned that she could have been exposed to a parasite because she ate undercooked deer meat at the end of November.  Her symptoms started approximately 4 weeks after that.  Her boyfriend who also ate deer meat had similar symptoms but has had since resolved.  She also reports intermittent headaches and blurry vision that has been intermittent over the past few weeks but is not currently present.  She is also had 20 to 30 pounds of unintentional weight loss and has noticed thinning hair as well.  No prior abdominal surgeries.  She also brought a stool sample for testing.    PAST MEDICAL HISTORY  Active Ambulatory Problems     Diagnosis Date Noted    Pregnancy 10/21/2021    Maternal anemia in pregnancy, antepartum 02/24/2022    History of GBS (group B streptococcus) UTI, currently pregnant 02/24/2022     Resolved Ambulatory Problems     Diagnosis Date Noted    No Resolved Ambulatory Problems     Past Medical History:   Diagnosis Date    Chlamydia 2019    Gonorrhea 2019    Urogenital trichomoniasis 2019       PAST SURGICAL HISTORY  No past surgical history on file.    FAMILY HISTORY  Family History   Problem Relation Age of Onset    Breast cancer Neg Hx     Ovarian cancer Neg Hx     Uterine cancer Neg Hx     Colon cancer Neg Hx        SOCIAL HISTORY  Social History      Socioeconomic History    Marital status: Single   Tobacco Use    Smoking status: Former    Smokeless tobacco: Current   Vaping Use    Vaping status: Every Day    Start date: 11/1/2021    Substances: Nicotine   Substance and Sexual Activity    Alcohol use: Not Currently    Drug use: Not Currently    Sexual activity: Not Currently     Birth control/protection: None       ALLERGIES  Patient has no known allergies.    REVIEW OF SYSTEMS  Review of Systems  Included in HPI  All systems reviewed and negative except for those discussed in HPI.    PHYSICAL EXAM    I have reviewed the triage vital signs and nursing notes.    ED Triage Vitals [04/05/25 1650]   Temp Heart Rate Resp BP SpO2   97 °F (36.1 °C) 88 16 115/58 99 %      Temp src Heart Rate Source Patient Position BP Location FiO2 (%)   Tympanic Monitor Sitting Right arm --       Physical Exam  Constitutional:       General: She is not in acute distress.     Appearance: Normal appearance. She is not ill-appearing or toxic-appearing.   HENT:      Head: Normocephalic and atraumatic.      Nose: Nose normal.      Mouth/Throat:      Mouth: Mucous membranes are moist.      Pharynx: Oropharynx is clear.   Eyes:      Extraocular Movements: Extraocular movements intact.      Conjunctiva/sclera: Conjunctivae normal.      Pupils: Pupils are equal, round, and reactive to light.   Cardiovascular:      Rate and Rhythm: Normal rate and regular rhythm.      Pulses: Normal pulses.      Heart sounds: Normal heart sounds. No murmur heard.     No friction rub. No gallop.   Pulmonary:      Effort: Pulmonary effort is normal. No respiratory distress.      Breath sounds: Normal breath sounds. No stridor. No wheezing, rhonchi or rales.   Abdominal:      General: Abdomen is flat. There is no distension.      Palpations: Abdomen is soft.      Tenderness: There is no abdominal tenderness. There is no guarding or rebound.   Musculoskeletal:      Cervical back: Normal range of motion and neck  supple.   Skin:     General: Skin is warm and dry.   Neurological:      General: No focal deficit present.      Mental Status: She is alert and oriented to person, place, and time. Mental status is at baseline.   Psychiatric:         Mood and Affect: Mood normal.         Behavior: Behavior normal.         Thought Content: Thought content normal.         Judgment: Judgment normal.         LAB RESULTS  Recent Results (from the past 24 hours)   Comprehensive Metabolic Panel    Collection Time: 04/05/25  6:16 PM    Specimen: Blood   Result Value Ref Range    Glucose 109 (H) 65 - 99 mg/dL    BUN 13 6 - 20 mg/dL    Creatinine 0.71 0.57 - 1.00 mg/dL    Sodium 136 136 - 145 mmol/L    Potassium 3.7 3.5 - 5.2 mmol/L    Chloride 102 98 - 107 mmol/L    CO2 25.6 22.0 - 29.0 mmol/L    Calcium 8.8 8.6 - 10.5 mg/dL    Total Protein 6.7 6.0 - 8.5 g/dL    Albumin 4.4 3.5 - 5.2 g/dL    ALT (SGPT) 12 1 - 33 U/L    AST (SGOT) 15 1 - 32 U/L    Alkaline Phosphatase 52 39 - 117 U/L    Total Bilirubin <0.2 0.0 - 1.2 mg/dL    Globulin 2.3 gm/dL    A/G Ratio 1.9 g/dL    BUN/Creatinine Ratio 18.3 7.0 - 25.0    Anion Gap 8.4 5.0 - 15.0 mmol/L    eGFR 122.7 >60.0 mL/min/1.73   Lipase    Collection Time: 04/05/25  6:16 PM    Specimen: Blood   Result Value Ref Range    Lipase 41 13 - 60 U/L   hCG, Serum, Qualitative    Collection Time: 04/05/25  6:16 PM    Specimen: Blood   Result Value Ref Range    HCG Qualitative Negative Negative   CBC Auto Differential    Collection Time: 04/05/25  6:16 PM    Specimen: Blood   Result Value Ref Range    WBC 7.56 3.40 - 10.80 10*3/mm3    RBC 4.17 3.77 - 5.28 10*6/mm3    Hemoglobin 11.3 (L) 12.0 - 15.9 g/dL    Hematocrit 35.3 34.0 - 46.6 %    MCV 84.7 79.0 - 97.0 fL    MCH 27.1 26.6 - 33.0 pg    MCHC 32.0 31.5 - 35.7 g/dL    RDW 12.3 12.3 - 15.4 %    RDW-SD 37.3 37.0 - 54.0 fl    MPV 10.1 6.0 - 12.0 fL    Platelets 335 140 - 450 10*3/mm3    Neutrophil % 62.7 42.7 - 76.0 %    Lymphocyte % 26.5 19.6 - 45.3 %     Monocyte % 8.1 5.0 - 12.0 %    Eosinophil % 1.6 0.3 - 6.2 %    Basophil % 0.8 0.0 - 1.5 %    Immature Grans % 0.3 0.0 - 0.5 %    Neutrophils, Absolute 4.75 1.70 - 7.00 10*3/mm3    Lymphocytes, Absolute 2.00 0.70 - 3.10 10*3/mm3    Monocytes, Absolute 0.61 0.10 - 0.90 10*3/mm3    Eosinophils, Absolute 0.12 0.00 - 0.40 10*3/mm3    Basophils, Absolute 0.06 0.00 - 0.20 10*3/mm3    Immature Grans, Absolute 0.02 0.00 - 0.05 10*3/mm3    nRBC 0.0 0.0 - 0.2 /100 WBC   Urinalysis With Microscopic If Indicated (No Culture) - Urine, Clean Catch    Collection Time: 04/05/25  6:17 PM    Specimen: Urine, Clean Catch   Result Value Ref Range    Color, UA Yellow Yellow, Straw    Appearance, UA Clear Clear    pH, UA 7.5 5.0 - 8.0    Specific Gravity, UA 1.010 1.005 - 1.030    Glucose, UA Negative Negative    Ketones, UA Negative Negative    Bilirubin, UA Negative Negative    Blood, UA Negative Negative    Protein, UA Negative Negative    Leuk Esterase, UA Negative Negative    Nitrite, UA Negative Negative    Urobilinogen, UA 0.2 E.U./dL 0.2 - 1.0 E.U./dL   Gastrointestinal Panel, PCR - Stool, Per Rectum    Collection Time: 04/05/25  6:21 PM    Specimen: Per Rectum; Stool   Result Value Ref Range    Campylobacter Not Detected Not Detected    Plesiomonas shigelloides Not Detected Not Detected    Salmonella Not Detected Not Detected    Vibrio Not Detected Not Detected    Vibrio cholerae Not Detected Not Detected    Yersinia enterocolitica Not Detected Not Detected    Enteroaggregative E. coli (EAEC) Not Detected Not Detected    Enteropathogenic E. coli (EPEC) Not Detected Not Detected    Enterotoxigenic E. coli (ETEC) lt/st Not Detected Not Detected    Shiga-like toxin-producing E. coli (STEC) stx1/stx2 Not Detected Not Detected    Shigella/Enteroinvasive E. coli (EIEC) Not Detected Not Detected    Cryptosporidium Not Detected Not Detected    Cyclospora cayetanensis Not Detected Not Detected    Entamoeba histolytica Not Detected Not  Detected    Giardia lamblia Not Detected Not Detected    Adenovirus F40/41 Not Detected Not Detected    Astrovirus Not Detected Not Detected    Norovirus GI/GII Not Detected Not Detected    Rotavirus A Not Detected Not Detected    Sapovirus (I, II, IV or V) Not Detected Not Detected       RADIOLOGY  CT Abdomen Pelvis With Contrast  Result Date: 4/5/2025  CT ABDOMEN PELVIS W CONTRAST-  HISTORY: Abdominal pain, weight loss, diarrhea and constipation.  TECHNIQUE:  CT of the abdomen and pelvis was performed following the administration of intravenous contrast. Reformatted images were reviewed..  Radiation dose reduction techniques were utilized, including automated exposure control and exposure modulation based on body size.  COMPARISON: None.  FINDINGS:  Lung bases and pleural spaces are clear. The liver is mildly enlarged, measuring 17.3 cm in craniocaudal length. There is periportal edema. The gallbladder is decompressed. The spleen is normal in size. The pancreas is within normal limits. The adrenal glands are within normal limits. The kidneys are within normal limits. No pathologically enlarged abdominal or pelvic lymph nodes are identified. There is a circumaortic left renal vein, a developmental variant. There are multiple dilated gonadal vessels on the left.. There are no dilated small bowel loops. The appendix is visualized. The colon is diffusely decompressed and poorly assessed. The bladder is mildly distended. The uterus is hyperenhancing, nonspecific and incompletely assessed. There is a corpus luteum cyst in the right ovary. There is small volume free pelvic fluid, likely physiologic in a patient of this age. There is relatively ill-defined fat stranding within the pelvis, most pronounced in the ventral left lower quadrant adjacent to the distal descending/proximal sigmoid colon. There is a metallic umbilical piercing. The visualized osseous structures are age-appropriate.        1.  Limited evaluation of  the GI tract due to poor distention and lack of oral contrast. Relatively ill-defined fat stranding is noted in the pelvis, most pronounced in the left lower quadrant. Some of this could be reactive/physiologic given patient age and/or due to fluid status but could also be due to underlying acute infectious/inflammatory process, possibly involving the small bowel or colon due to the patient's provided history and location of stranding. Recommend clinical evaluation and follow-up with repeat imaging as clinically directed. 2.  Multiple dilated gonadal vessels on the left, which can be seen with pelvic congestion syndrome. Uterine hyperenhancement, which is nonspecific. Correlate with menstrual status and pelvic ultrasound. 3.  Mild hepatomegaly with hepatic venous congestion. Correlate with fluid status/volume overload.   This report was finalized on 4/5/2025 7:32 PM by Dr. Kaylah De La Cruz M.D on Workstation: [id number]      CT Head Without Contrast  Result Date: 4/5/2025  EXAM:  CT HEAD WO CONTRAST-  HISTORY: Headache, blurry vision, confusion,  COMPARISON: None  TECHNIQUE: Noncontrast images of the brain were obtained. Reformatted images were reviewed. Radiation dose reduction techniques were utilized, including automated exposure control and exposure modulation based on body size.  FINDINGS:   The ventricles and sulci are within normal limits.  No acute intracranial hemorrhage or pathologic extra-axial collection is identified.  There is no midline shift or mass effect.  The basal cisterns are patent.  The grey-white matter differentiation is maintained. There are metallic nasal piercings.       No acute intracranial abnormality. Consider further evaluation with MRI, as clinically directed.  This report was finalized on 4/5/2025 7:23 PM by Dr. Kaylah De La Cruz M.D on Workstation: [id number]        MEDICATIONS GIVEN IN ER  Medications   sodium chloride 0.9 % bolus 1,000 mL (1,000 mL Intravenous New Bag 4/5/25  1821)   iopamidol (ISOVUE-300) 61 % injection 100 mL (85 mL Intravenous Given by Other 4/5/25 1911)       ORDERS PLACED DURING THIS VISIT:  Orders Placed This Encounter   Procedures    Gastrointestinal Panel, PCR - Stool, Per Rectum    CT Head Without Contrast    CT Abdomen Pelvis With Contrast    Comprehensive Metabolic Panel    Lipase    hCG, Serum, Qualitative    Urinalysis With Microscopic If Indicated (No Culture) - Urine, Clean Catch    CBC Auto Differential    CBC & Differential       OUTPATIENT MEDICATION MANAGEMENT:  No current Epic-ordered facility-administered medications on file.     Current Outpatient Medications Ordered in Epic   Medication Sig Dispense Refill    albendazole (ALBENZA) 200 MG tablet Take 2 tablets by mouth Daily for 4 days. 8 tablet 0    fluconazole (Diflucan) 150 MG tablet Take 1 tablet by mouth Every 3 (Three) Days. 2 tablet 0    hydrOXYzine pamoate (VISTARIL) 25 MG capsule LAST REFILL UNTIL SEEN 1 CAPSULE AS NEEDED UP TO 3 TIMES ORALLY ONCE A DAY 30 DAYS      lamoTRIgine (LaMICtal) 100 MG tablet Take 1 tablet by mouth Daily.      medroxyPROGESTERone Acetate (Depo-SubQ Provera 104) 104 MG/0.65ML suspension prefilled syringe Inject 0.65 mL under the skin into the appropriate area as directed Every 3 (Three) Months. 0.65 mL 3       PROCEDURES  Procedures    PROGRESS, DATA ANALYSIS, CONSULTS, AND MEDICAL DECISION MAKING  All labs have been independently interpreted by me.  All radiology studies have been reviewed by me. All EKG's have been independently viewed and interpreted by me.  Discussion below represents my analysis of pertinent findings related to patient's condition, differential diagnosis, treatment plan and final disposition.    Differential diagnosis includes but is not limited to parasitic infection, intracranial hemorrhage, brain mass, enteritis, diverticulitis, colitis.    Clinical Scores:                   ED Course as of 04/05/25 1952   Sat Apr 05, 2025 1822 The  patient is taken some photographs of her most recent bowel movements.  There is some white debris mixed within the stool that does appear to be a insect casing.  There also is long string-like forms that could be parasitic. [AB]   1831 Urinalysis With Microscopic If Indicated (No Culture) - Urine, Clean Catch [AB]   1831 WBC: 7.56 [AB]   1831 Hemoglobin(!): 11.3 [AB]   1916 ALT (SGPT): 12 [AB]   1916 AST (SGOT): 15 [AB]   1916 Total Bilirubin: <0.2 [AB]   1916 HCG Qualitative: Negative [AB]   1916 CT Head Without Contrast  My independent interpretation of the imaging study is no ICH [AB]   1929 CT Abdomen Pelvis With Contrast  My independent interpretation of the imaging study is no SBO or free air [AB]   1950 Gastrointestinal Panel, PCR - Stool, Per Rectum [AB]   1950 Updated patient on results.  Appears benign.  However, given the photographs the patient taken, think is reasonable to cover for parasitic infection.  Unclear what the parasite would be so will cover broadly with albendazole for the next 3 days with a subsequent dose within 2 weeks to cover for pinworms.  Patient agreeable with plan and discharge. [AB]      ED Course User Index  [AB] Genaro Griggs MD             AS OF 19:52 EDT VITALS:    BP - 115/58  HR - 88  TEMP - 97 °F (36.1 °C) (Tympanic)  O2 SATS - 99%    COMPLEXITY OF CARE  Admission was considered but after careful review of the patient's presentation, physical examination, diagnostic results, and response to treatment the patient may be safely discharged with outpatient follow-up.      DIAGNOSIS  Final diagnoses:   Intestinal parasitism         DISPOSITION  ED Disposition       ED Disposition   Discharge    Condition   Stable    Comment   --                Please note that portions of this document were completed with a voice recognition program.    Note Disclaimer: At [facility], we believe that sharing information builds trust and better relationships. You are receiving this note  because you recently visited [facility]. It is possible you will see health information before a provider has talked with you about it. This kind of information can be easy to misunderstand. To help you fully understand what it means for your health, we urge you to discuss this note with your provider.         Genaro Griggs MD  04/05/25 1952

## 2025-04-08 ENCOUNTER — OFFICE VISIT (OUTPATIENT)
Dept: GASTROENTEROLOGY | Facility: CLINIC | Age: 25
End: 2025-04-08
Payer: MEDICAID

## 2025-04-08 VITALS
HEIGHT: 62 IN | TEMPERATURE: 98.3 F | BODY MASS INDEX: 21.31 KG/M2 | SYSTOLIC BLOOD PRESSURE: 95 MMHG | HEART RATE: 69 BPM | WEIGHT: 115.8 LBS | DIASTOLIC BLOOD PRESSURE: 52 MMHG

## 2025-04-08 DIAGNOSIS — D64.9 ANEMIA, UNSPECIFIED TYPE: ICD-10-CM

## 2025-04-08 DIAGNOSIS — R19.4 CHANGE IN BOWEL HABITS: Primary | ICD-10-CM

## 2025-04-08 DIAGNOSIS — R19.8 ALTERNATING CONSTIPATION AND DIARRHEA: ICD-10-CM

## 2025-04-08 RX ORDER — IBUPROFEN 800 MG/1
800 TABLET, FILM COATED ORAL EVERY 8 HOURS PRN
COMMUNITY
Start: 2025-01-29

## 2025-04-08 NOTE — Clinical Note
Please call [facility] and get a copy of labs drawn from this morning.  Thanks Mame

## 2025-04-08 NOTE — PROGRESS NOTES
"Chief Complaint   Patient presents with    Change in bowel habits       HPI    Anita Lazcano is a  25 y.o. female here to establish care as a new patient for complaints of change in bowel habits.    This patient will also follow with Dr. Tirado.    She has a past medical history of anemia with a hemoglobin of 11.3 on recent labs noted to have anemia dating back to 2021.    She was in the emergency room in April for complaints of acute onset abdominal cramping, diarrhea and constipation concerned that she might have had a parasitic infection after she had undercooked deer meat November 2024.  CT A/P with IV contrast only with ill-defined fat stranding noted in the pelvis most pronounced in the left lower quadrant.  Could represent underlying acute infectious/inflammatory process possibly involving the small bowel or colon.  Possible pelvic congestion syndrome.  Uterine hyperenhancement.  Mild hepatomegaly with hepatic venous congestion.  GI PCR was negative.  LFTs and lipase were normal.  She was treated with albendazole for potential parasitic infection.    On visit today she recounts change in bowel habits around November/December after she feels like she ate contaminated or undercooked deer meat.  Since that time she has had lower abdominal cramps, alternating bowel pattern between episodes of diarrhea and feelings of constipation.  Associated gas and bloating without rectal bleeding or rectal pain.  Appetite has been poor.  Her current weight is 115 pounds.  She weighed 123 pounds in the spring 2023.    She recounts episodes of nausea and feeling \"lightheaded.\"  She has occasional \"brain fog.\"  She has determined she is lactose intolerant.    She had lab work drawn today at [facility] unavailable for review.  She also had a urinalysis.    Family history of inflammatory bowel disease or colon cancer.    She is currently off birth control.  She is having 2 menstrual cycles of month.  She needs to " follow-up with her OB/GYN.    Past Medical History:   Diagnosis Date    Chlamydia 2019    Gonorrhea 2019    Urogenital trichomoniasis 2019       History reviewed. No pertinent surgical history.    Scheduled Meds:     Continuous Infusions: No current facility-administered medications for this visit.      PRN Meds:     No Known Allergies    Social History     Socioeconomic History    Marital status: Single   Tobacco Use    Smoking status: Former    Smokeless tobacco: Current   Vaping Use    Vaping status: Every Day    Start date: 11/1/2021    Substances: Nicotine    Devices: Disposable   Substance and Sexual Activity    Alcohol use: Not Currently    Drug use: Not Currently    Sexual activity: Not Currently     Birth control/protection: None       Family History   Problem Relation Age of Onset    Breast cancer Neg Hx     Ovarian cancer Neg Hx     Uterine cancer Neg Hx     Colon cancer Neg Hx        Review of Systems   Constitutional:  Positive for appetite change.   Gastrointestinal:  Positive for diarrhea and nausea.       Vitals:    04/08/25 1313   BP: 95/52   Pulse: 69   Temp: 98.3 °F (36.8 °C)       Physical Exam  Constitutional:       Appearance: She is well-developed.   Abdominal:      General: Bowel sounds are normal. There is no distension.      Palpations: Abdomen is soft. There is no mass.      Tenderness: There is no abdominal tenderness. There is no guarding.      Hernia: No hernia is present.   Skin:     General: Skin is warm and dry.      Capillary Refill: Capillary refill takes less than 2 seconds.   Neurological:      Mental Status: She is alert and oriented to person, place, and time.   Psychiatric:         Behavior: Behavior normal.     Assessment    Diagnoses and all orders for this visit:    1. Change in bowel habits (Primary)  -     Ova & Parasite Examination - Stool, Per Rectum    2. Alternating constipation and diarrhea    3. Anemia, unspecified type       Plan    Based on patient descriptions of  symptoms suspect acute onset foodborne illness around November/December with lingering symptoms.  She remains concerned that she could have a parasitic infection.  She would like to complete Albendazole prescribed following recent ER evaluation.  We talked about repeat stool test in the form of O&P 2 weeks after she completes medication.  I will obtain a copy of lab work drawn at her local [facility] clinic this morning for reassessment of hemoglobin.  She will likely need to have iron indices B12 and folate drawn.  Encouraged her to follow-up with her OB/GYN for abnormal menstrual cycle.  Return to clinic 1 month.          JOCELIN Bee  [facility]  [address] [place] [address]  Office: [phone]

## 2025-04-10 ENCOUNTER — TELEPHONE (OUTPATIENT)
Dept: GASTROENTEROLOGY | Facility: CLINIC | Age: 25
End: 2025-04-10
Payer: MEDICAID

## 2025-04-10 NOTE — TELEPHONE ENCOUNTER
----- Message from Mame Caceres sent at 4/8/2025  1:41 PM EDT -----  Please call [facility] and get a copy of labs drawn from this morning.  Thanks Mame

## 2025-04-25 LAB
O+P SPEC MICRO: NORMAL
O+P STL CONC: NORMAL

## 2025-05-19 ENCOUNTER — TELEPHONE (OUTPATIENT)
Dept: OBSTETRICS AND GYNECOLOGY | Facility: CLINIC | Age: 25
End: 2025-05-19
Payer: MEDICAID

## 2025-05-23 ENCOUNTER — OFFICE VISIT (OUTPATIENT)
Dept: GASTROENTEROLOGY | Facility: CLINIC | Age: 25
End: 2025-05-23
Payer: MEDICAID

## 2025-05-23 VITALS
BODY MASS INDEX: 20.43 KG/M2 | DIASTOLIC BLOOD PRESSURE: 69 MMHG | TEMPERATURE: 97.7 F | SYSTOLIC BLOOD PRESSURE: 108 MMHG | HEART RATE: 72 BPM | HEIGHT: 62 IN | WEIGHT: 111 LBS

## 2025-05-23 DIAGNOSIS — R14.2 FLATULENCE, ERUCTATION AND GAS PAIN: Primary | ICD-10-CM

## 2025-05-23 DIAGNOSIS — R14.3 FLATULENCE, ERUCTATION AND GAS PAIN: Primary | ICD-10-CM

## 2025-05-23 DIAGNOSIS — R14.1 FLATULENCE, ERUCTATION AND GAS PAIN: Primary | ICD-10-CM

## 2025-05-23 PROCEDURE — 1159F MED LIST DOCD IN RCRD: CPT | Performed by: NURSE PRACTITIONER

## 2025-05-23 PROCEDURE — 99214 OFFICE O/P EST MOD 30 MIN: CPT | Performed by: NURSE PRACTITIONER

## 2025-05-23 PROCEDURE — 1160F RVW MEDS BY RX/DR IN RCRD: CPT | Performed by: NURSE PRACTITIONER

## 2025-05-23 RX ORDER — FAMOTIDINE 40 MG/1
40 TABLET, FILM COATED ORAL DAILY
Qty: 30 TABLET | Refills: 3 | Status: SHIPPED | OUTPATIENT
Start: 2025-05-23

## 2025-05-23 NOTE — PROGRESS NOTES
Chief Complaint   Patient presents with    Gas, bloating and eructation       HPI    Anita Lazcano is a  25 y.o. female here for a follow up visit for gas and bloating.    This patient follows myself and Dr. Tirado.    Patient seen today in follow-up status post treatment with Albendazole after she was seen in the emergency room in April for suspected a parasitic infection although O&P was not performed.  She reports feeling much improved on visit today.  She is passing formed stool with only 1 episode of diarrhea after she ate [employer] recently.  Denies abdominal pain, rectal pain or rectal bleeding.  She does report lingering gas, bloating and occasional belching.  No nausea, vomiting, dysphagia or odynophagia.    Past Medical History:   Diagnosis Date    Chlamydia 2019    Gonorrhea 2019    Urogenital trichomoniasis 2019       History reviewed. No pertinent surgical history.    Scheduled Meds:     Continuous Infusions: No current facility-administered medications for this visit.      PRN Meds:     No Known Allergies    Social History     Socioeconomic History    Marital status: Single   Tobacco Use    Smoking status: Former    Smokeless tobacco: Current   Vaping Use    Vaping status: Former    Start date: 11/1/2021    Quit date: 5/3/2025    Substances: Nicotine    Devices: Disposable   Substance and Sexual Activity    Alcohol use: Not Currently    Drug use: Not Currently    Sexual activity: Not Currently     Birth control/protection: None       Family History   Problem Relation Age of Onset    Breast cancer Neg Hx     Ovarian cancer Neg Hx     Uterine cancer Neg Hx     Colon cancer Neg Hx      Vitals:    05/23/25 1021   BP: 108/69   Pulse: 72   Temp: 97.7 °F (36.5 °C)       Physical Exam  Constitutional:       Appearance: She is well-developed.   Abdominal:      General: Bowel sounds are normal. There is no distension.      Palpations: Abdomen is soft. There is no mass.      Tenderness: There is no abdominal  tenderness. There is no guarding.      Hernia: No hernia is present.   Skin:     General: Skin is warm and dry.      Capillary Refill: Capillary refill takes less than 2 seconds.   Neurological:      Mental Status: She is alert and oriented to person, place, and time.   Psychiatric:         Behavior: Behavior normal.     Assessment    Diagnoses and all orders for this visit:    1. Flatulence, eructation and gas pain (Primary)    Other orders  -     famotidine (Pepcid) 40 MG tablet; Take 1 tablet by mouth Daily.  Dispense: 30 tablet; Refill: 3    Plan    Trial Pepcid 40 mg once daily and monitor for symptom improvement in the coming weeks, stop after 1 month if found to be beneficial can take as needed thereafter  Recommend she avoid fried, processed and fast foods which are certainly a trigger  Trial low FODMAP diet-handout with dietary options provided  Follow-up as needed         JOCELIN Bee  [facility]  [address] [place] [address]  Office: [phone]    I spent 30 minutes caring for Anita on this date of service. This time includes time spent by me in the following activities: preparing for the visit, reviewing tests, obtaining and/or reviewing a separately obtained history, performing a medically appropriate examination and/or evaluation , counseling and educating the patient/family/caregiver, ordering medications, tests, or procedures, documenting information in the medical record, independently interpreting results and communicating that information with the patient/family/caregiver and care coordination.